# Patient Record
Sex: MALE | Race: WHITE | NOT HISPANIC OR LATINO | URBAN - METROPOLITAN AREA
[De-identification: names, ages, dates, MRNs, and addresses within clinical notes are randomized per-mention and may not be internally consistent; named-entity substitution may affect disease eponyms.]

---

## 2023-01-30 ENCOUNTER — HOSPITAL ENCOUNTER (OUTPATIENT)
Dept: CARDIOLOGY | Facility: HOSPITAL | Age: 28
Discharge: HOME | End: 2023-01-30
Attending: INTERNAL MEDICINE
Payer: COMMERCIAL

## 2023-01-30 ENCOUNTER — OFFICE VISIT (OUTPATIENT)
Dept: CARDIOLOGY | Facility: CLINIC | Age: 28
End: 2023-01-30
Payer: COMMERCIAL

## 2023-01-30 ENCOUNTER — OFFICE VISIT (OUTPATIENT)
Dept: INTERNAL MEDICINE | Facility: CLINIC | Age: 28
End: 2023-01-30
Payer: COMMERCIAL

## 2023-01-30 VITALS
SYSTOLIC BLOOD PRESSURE: 134 MMHG | HEIGHT: 67 IN | BODY MASS INDEX: 32.89 KG/M2 | TEMPERATURE: 98 F | DIASTOLIC BLOOD PRESSURE: 88 MMHG | OXYGEN SATURATION: 98 % | WEIGHT: 209.56 LBS | HEART RATE: 61 BPM | RESPIRATION RATE: 18 BRPM

## 2023-01-30 VITALS
HEART RATE: 57 BPM | HEIGHT: 61 IN | DIASTOLIC BLOOD PRESSURE: 90 MMHG | OXYGEN SATURATION: 98 % | WEIGHT: 212.5 LBS | SYSTOLIC BLOOD PRESSURE: 134 MMHG | BODY MASS INDEX: 40.12 KG/M2

## 2023-01-30 DIAGNOSIS — R07.9 CHEST PAIN ON EXERTION: Primary | ICD-10-CM

## 2023-01-30 DIAGNOSIS — R06.09 DYSPNEA ON EXERTION: ICD-10-CM

## 2023-01-30 DIAGNOSIS — Z00.00 ROUTINE GENERAL MEDICAL EXAMINATION AT A HEALTH CARE FACILITY: Primary | ICD-10-CM

## 2023-01-30 DIAGNOSIS — Z13.220 SCREENING FOR LIPOID DISORDERS: ICD-10-CM

## 2023-01-30 DIAGNOSIS — R07.9 CHEST PAIN ON EXERTION: ICD-10-CM

## 2023-01-30 DIAGNOSIS — Z13.29 THYROID DISORDER SCREEN: ICD-10-CM

## 2023-01-30 DIAGNOSIS — I10 PRIMARY HYPERTENSION: Primary | ICD-10-CM

## 2023-01-30 DIAGNOSIS — Z11.59 NEED FOR HEPATITIS C SCREENING TEST: ICD-10-CM

## 2023-01-30 DIAGNOSIS — Z11.4 SCREENING FOR HIV (HUMAN IMMUNODEFICIENCY VIRUS): ICD-10-CM

## 2023-01-30 DIAGNOSIS — E78.2 MIXED HYPERLIPIDEMIA: ICD-10-CM

## 2023-01-30 DIAGNOSIS — I10 ESSENTIAL HYPERTENSION: Primary | ICD-10-CM

## 2023-01-30 PROCEDURE — 1159F MED LIST DOCD IN RCRD: CPT | Mod: CPTII,S$GLB,, | Performed by: FAMILY MEDICINE

## 2023-01-30 PROCEDURE — 3075F PR MOST RECENT SYSTOLIC BLOOD PRESS GE 130-139MM HG: ICD-10-PCS | Mod: CPTII,S$GLB,, | Performed by: INTERNAL MEDICINE

## 2023-01-30 PROCEDURE — 3075F SYST BP GE 130 - 139MM HG: CPT | Mod: CPTII,S$GLB,, | Performed by: INTERNAL MEDICINE

## 2023-01-30 PROCEDURE — 3075F PR MOST RECENT SYSTOLIC BLOOD PRESS GE 130-139MM HG: ICD-10-PCS | Mod: CPTII,S$GLB,, | Performed by: FAMILY MEDICINE

## 2023-01-30 PROCEDURE — 3075F SYST BP GE 130 - 139MM HG: CPT | Mod: CPTII,S$GLB,, | Performed by: FAMILY MEDICINE

## 2023-01-30 PROCEDURE — 3008F PR BODY MASS INDEX (BMI) DOCUMENTED: ICD-10-PCS | Mod: CPTII,S$GLB,, | Performed by: FAMILY MEDICINE

## 2023-01-30 PROCEDURE — 93005 ELECTROCARDIOGRAM TRACING: CPT

## 2023-01-30 PROCEDURE — 1160F RVW MEDS BY RX/DR IN RCRD: CPT | Mod: CPTII,S$GLB,, | Performed by: INTERNAL MEDICINE

## 2023-01-30 PROCEDURE — 3008F BODY MASS INDEX DOCD: CPT | Mod: CPTII,S$GLB,, | Performed by: FAMILY MEDICINE

## 2023-01-30 PROCEDURE — 3079F DIAST BP 80-89 MM HG: CPT | Mod: CPTII,S$GLB,, | Performed by: FAMILY MEDICINE

## 2023-01-30 PROCEDURE — 90471 IMMUNIZATION ADMIN: CPT | Mod: S$GLB,,, | Performed by: FAMILY MEDICINE

## 2023-01-30 PROCEDURE — 99999 PR PBB SHADOW E&M-EST. PATIENT-LVL IV: CPT | Mod: PBBFAC,,, | Performed by: INTERNAL MEDICINE

## 2023-01-30 PROCEDURE — 3080F DIAST BP >= 90 MM HG: CPT | Mod: CPTII,S$GLB,, | Performed by: INTERNAL MEDICINE

## 2023-01-30 PROCEDURE — 1159F MED LIST DOCD IN RCRD: CPT | Mod: CPTII,S$GLB,, | Performed by: INTERNAL MEDICINE

## 2023-01-30 PROCEDURE — 99204 OFFICE O/P NEW MOD 45 MIN: CPT | Mod: S$GLB,,, | Performed by: INTERNAL MEDICINE

## 2023-01-30 PROCEDURE — 93010 EKG 12-LEAD: ICD-10-PCS | Mod: ,,, | Performed by: INTERNAL MEDICINE

## 2023-01-30 PROCEDURE — 93010 ELECTROCARDIOGRAM REPORT: CPT | Mod: ,,, | Performed by: INTERNAL MEDICINE

## 2023-01-30 PROCEDURE — 3080F PR MOST RECENT DIASTOLIC BLOOD PRESSURE >= 90 MM HG: ICD-10-PCS | Mod: CPTII,S$GLB,, | Performed by: INTERNAL MEDICINE

## 2023-01-30 PROCEDURE — 1160F PR REVIEW ALL MEDS BY PRESCRIBER/CLIN PHARMACIST DOCUMENTED: ICD-10-PCS | Mod: CPTII,S$GLB,, | Performed by: INTERNAL MEDICINE

## 2023-01-30 PROCEDURE — 99204 PR OFFICE/OUTPT VISIT, NEW, LEVL IV, 45-59 MIN: ICD-10-PCS | Mod: S$GLB,,, | Performed by: INTERNAL MEDICINE

## 2023-01-30 PROCEDURE — 99999 PR PBB SHADOW E&M-NEW PATIENT-LVL IV: CPT | Mod: PBBFAC,,, | Performed by: FAMILY MEDICINE

## 2023-01-30 PROCEDURE — 99204 OFFICE O/P NEW MOD 45 MIN: CPT | Mod: 25,S$GLB,, | Performed by: FAMILY MEDICINE

## 2023-01-30 PROCEDURE — 3008F BODY MASS INDEX DOCD: CPT | Mod: CPTII,S$GLB,, | Performed by: INTERNAL MEDICINE

## 2023-01-30 PROCEDURE — 99999 PR PBB SHADOW E&M-EST. PATIENT-LVL IV: ICD-10-PCS | Mod: PBBFAC,,, | Performed by: INTERNAL MEDICINE

## 2023-01-30 PROCEDURE — 1159F PR MEDICATION LIST DOCUMENTED IN MEDICAL RECORD: ICD-10-PCS | Mod: CPTII,S$GLB,, | Performed by: INTERNAL MEDICINE

## 2023-01-30 PROCEDURE — 90686 IIV4 VACC NO PRSV 0.5 ML IM: CPT | Mod: S$GLB,,, | Performed by: FAMILY MEDICINE

## 2023-01-30 PROCEDURE — 1159F PR MEDICATION LIST DOCUMENTED IN MEDICAL RECORD: ICD-10-PCS | Mod: CPTII,S$GLB,, | Performed by: FAMILY MEDICINE

## 2023-01-30 PROCEDURE — 99204 PR OFFICE/OUTPT VISIT, NEW, LEVL IV, 45-59 MIN: ICD-10-PCS | Mod: 25,S$GLB,, | Performed by: FAMILY MEDICINE

## 2023-01-30 PROCEDURE — 90686 FLU VACCINE (QUAD) GREATER THAN OR EQUAL TO 3YO PRESERVATIVE FREE IM: ICD-10-PCS | Mod: S$GLB,,, | Performed by: FAMILY MEDICINE

## 2023-01-30 PROCEDURE — 90471 FLU VACCINE (QUAD) GREATER THAN OR EQUAL TO 3YO PRESERVATIVE FREE IM: ICD-10-PCS | Mod: S$GLB,,, | Performed by: FAMILY MEDICINE

## 2023-01-30 PROCEDURE — 3008F PR BODY MASS INDEX (BMI) DOCUMENTED: ICD-10-PCS | Mod: CPTII,S$GLB,, | Performed by: INTERNAL MEDICINE

## 2023-01-30 PROCEDURE — 4010F ACE/ARB THERAPY RXD/TAKEN: CPT | Mod: CPTII,S$GLB,, | Performed by: INTERNAL MEDICINE

## 2023-01-30 PROCEDURE — 4010F PR ACE/ARB THEARPY RXD/TAKEN: ICD-10-PCS | Mod: CPTII,S$GLB,, | Performed by: INTERNAL MEDICINE

## 2023-01-30 PROCEDURE — 3079F PR MOST RECENT DIASTOLIC BLOOD PRESSURE 80-89 MM HG: ICD-10-PCS | Mod: CPTII,S$GLB,, | Performed by: FAMILY MEDICINE

## 2023-01-30 PROCEDURE — 99999 PR PBB SHADOW E&M-NEW PATIENT-LVL IV: ICD-10-PCS | Mod: PBBFAC,,, | Performed by: FAMILY MEDICINE

## 2023-01-30 RX ORDER — LOSARTAN POTASSIUM 25 MG/1
25 TABLET ORAL DAILY
Qty: 30 TABLET | Refills: 1 | Status: SHIPPED | OUTPATIENT
Start: 2023-01-30 | End: 2023-03-01 | Stop reason: SDUPTHER

## 2023-01-30 NOTE — PROGRESS NOTES
Subjective:       Patient ID: Alejandro Mancia is a 27 y.o. male here today to establish care.    Chief Complaint: Establish Care    Patient presents to clinic today to establish care.  Patient reports concerns about his blood pressure.  He reports it has been borderline for several years with occasional spikes with associated symptoms.  He reports having blood pressure is elevated at times up to 180 systolic over 90s diastolic.  He reports having spotty vision and headaches when this occurs.  Patient also reports shortness of breath and chest pain with exertion.  He works as a  and commonly climbs ladders.  Patient is otherwise without concerns today.    Review of Systems   Constitutional:  Positive for fatigue (Chronic). Negative for chills, fever and unexpected weight change.   HENT:  Negative for congestion, dental problem, ear pain, hearing loss, rhinorrhea and trouble swallowing.    Eyes:  Positive for visual disturbance (Chronic, when blood pressure is elevated). Negative for pain.   Respiratory:  Positive for shortness of breath (Chronic, when active). Negative for cough.    Cardiovascular:  Positive for chest pain (Chronic, when active). Negative for palpitations and leg swelling.   Gastrointestinal:  Negative for abdominal distention, abdominal pain, blood in stool, constipation, diarrhea, nausea and vomiting.   Genitourinary:  Negative for difficulty urinating, scrotal swelling and testicular pain.   Musculoskeletal:  Negative for arthralgias and myalgias.   Skin:  Negative for rash.   Neurological:  Positive for weakness (Chronic, when blood pressure is elevated) and headaches (Chronic, when blood pressure is elevated). Negative for dizziness and numbness.   Hematological:  Negative for adenopathy. Does not bruise/bleed easily.   Psychiatric/Behavioral:  Positive for dysphoric mood (Chronic) and sleep disturbance (Chronic). The patient is nervous/anxious (Chronic).      Objective:       Physical Exam  Vitals reviewed.   Constitutional:       General: He is not in acute distress.     Appearance: He is well-developed.   HENT:      Head: Normocephalic and atraumatic.   Eyes:      General: Lids are normal. No scleral icterus.     Extraocular Movements: Extraocular movements intact.      Conjunctiva/sclera: Conjunctivae normal.      Pupils: Pupils are equal, round, and reactive to light.   Cardiovascular:      Rate and Rhythm: Normal rate and regular rhythm.      Heart sounds: No murmur heard.    No friction rub. No gallop.   Pulmonary:      Effort: Pulmonary effort is normal.      Breath sounds: Normal breath sounds. No decreased breath sounds, wheezing, rhonchi or rales.   Neurological:      Mental Status: He is alert and oriented to person, place, and time.      Cranial Nerves: No cranial nerve deficit.      Gait: Gait normal.   Psychiatric:         Mood and Affect: Mood and affect normal.       Assessment:       1. Essential hypertension    2. Dyspnea on exertion    3. Chest pain on exertion        Plan:   1. Essential hypertension  Assessment & Plan:  Will start patient on losartan 25 mg daily and reassess in 1 month    Orders:  -     losartan (COZAAR) 25 MG tablet; Take 1 tablet (25 mg total) by mouth once daily.  Dispense: 30 tablet; Refill: 1    2. Dyspnea on exertion  -     Ambulatory referral/consult to Cardiology; Future; Expected date: 02/06/2023    3. Chest pain on exertion  -     Ambulatory referral/consult to Cardiology; Future; Expected date: 02/06/2023    Other orders  -     Influenza - Quadrivalent (PF)        Health Maintenance reviewed/updated.

## 2023-01-30 NOTE — PROGRESS NOTES
Subjective:   Patient ID:  Alejandro Mancia is a 27 y.o. male who presents for evaluation of Chest Pain and Shortness of Breath      26 yo male. Referred for high BP and HLD.  PMH HNT and HLD   C/o episodes of blurred visions for 30 min, headache for the whole day nausea weakness. Checked the BP at one episode and was 170/95 mmHG. 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 And once a month.  No smoking and drinking  No f/h premature CAD   The labs from the work done in  normal liver and kidney function, CBC.  and  possibly not fast work        Past Medical History:   Diagnosis Date    ADHD (attention deficit hyperactivity disorder)     Anxiety     Hypertension        Past Surgical History:   Procedure Laterality Date    SMALL INTESTINE SURGERY         Social History     Tobacco Use    Smoking status: Never    Smokeless tobacco: Never   Substance Use Topics    Alcohol use: Never    Drug use: Never       Family History   Problem Relation Age of Onset    Cancer Maternal Uncle     Hypertension Maternal Grandmother     Hypertension Paternal Grandfather        Review of Systems   Constitutional: Negative for decreased appetite, diaphoresis, fever, malaise/fatigue and night sweats.   HENT:  Negative for nosebleeds.    Eyes:  Positive for blurred vision. Negative for double vision.   Cardiovascular:  Positive for dyspnea on exertion. Negative for chest pain, claudication, irregular heartbeat, leg swelling, near-syncope, orthopnea, palpitations, paroxysmal nocturnal dyspnea and syncope.   Respiratory:  Negative for cough, shortness of breath, sleep disturbances due to breathing, snoring, sputum production and wheezing.    Endocrine: Negative for cold intolerance and polyuria.   Hematologic/Lymphatic: Does not bruise/bleed easily.   Skin:  Negative for rash.   Musculoskeletal:  Negative for back pain, falls, joint pain, joint swelling and neck pain.    Gastrointestinal:  Negative for abdominal pain, heartburn, nausea and vomiting.   Genitourinary:  Negative for dysuria, frequency and hematuria.   Neurological:  Positive for headaches. Negative for difficulty with concentration, dizziness, focal weakness, light-headedness, numbness, seizures and weakness.   Psychiatric/Behavioral:  Negative for depression, memory loss and substance abuse. The patient does not have insomnia.    Allergic/Immunologic: Negative for HIV exposure and hives.     Objective:   Physical Exam  HENT:      Head: Normocephalic.   Eyes:      Pupils: Pupils are equal, round, and reactive to light.   Neck:      Thyroid: No thyromegaly.      Vascular: Normal carotid pulses. No carotid bruit or JVD.   Cardiovascular:      Rate and Rhythm: Normal rate and regular rhythm. No extrasystoles are present.     Chest Wall: PMI is not displaced.      Pulses: Normal pulses.      Heart sounds: Normal heart sounds. No murmur heard.    No gallop. No S3 sounds.   Pulmonary:      Effort: No respiratory distress.      Breath sounds: Normal breath sounds. No stridor.   Abdominal:      General: Bowel sounds are normal.      Palpations: Abdomen is soft.      Tenderness: There is no abdominal tenderness. There is no rebound.   Musculoskeletal:         General: Normal range of motion.   Skin:     Findings: No rash.   Neurological:      Mental Status: He is alert and oriented to person, place, and time.   Psychiatric:         Behavior: Behavior normal.       No results found for: CHOL  No results found for: HDL  No results found for: LDLCALC  No results found for: TRIG  No results found for: CHOLHDL    Chemistry    No results found for: NA, K, CL, CO2, BUN, CREATININE, GLU No results found for: CALCIUM, ALKPHOS, AST, ALT, BILITOT, ESTGFRAFRICA, EGFRNONAA       No results found for: LABA1C, HGBA1C  No results found for: TSH  No results found for: INR, PROTIME  No results found for: WBC, HGB, HCT, MCV,  PLT  BNP  @LABRCNTIP(BNP,BNPTRIAGEBLO)@  CrCl cannot be calculated (No successful lab value found.).  No results found in the last 24 hours.  No results found in the last 24 hours.  No results found in the last 24 hours.    Assessment:      1. Primary hypertension    2. Dyspnea on exertion    3. Chest pain on exertion    4. Mixed hyperlipidemia        Plan:   Echo and ETT for HTN and chest pain/SOB  Harpreet./renin and metanephrine to r/o 2nd HTN  Renal ARtery US ordered  Fasting Lipid profile for HLD  Continue Losartan for HTN    Counseled DASH  Recommend heart-healthy diet, weight control and regular exercise.  Carroll. Risk modification.   I have reviewed all pertinent labs and cardiac studies independently. Plans and recommendations have been formulated under my direct supervision. All questions answered and patient voiced understanding.   If symptoms persist go to the ED  F/u with PCP

## 2023-01-31 ENCOUNTER — TELEPHONE (OUTPATIENT)
Dept: CARDIOLOGY | Facility: CLINIC | Age: 28
End: 2023-01-31
Payer: COMMERCIAL

## 2023-01-31 NOTE — TELEPHONE ENCOUNTER
Pt contacted about results, pt verbalized understanding.          ----- Message from David Tavarez MD sent at 1/30/2023  9:36 PM CST -----  . Nomral range

## 2023-02-08 ENCOUNTER — TELEPHONE (OUTPATIENT)
Dept: CARDIOLOGY | Facility: CLINIC | Age: 28
End: 2023-02-08
Payer: COMMERCIAL

## 2023-02-08 NOTE — TELEPHONE ENCOUNTER
Pt contacted about results, lvm for pt to back            ----- Message from David Tavarez MD sent at 2/7/2023 10:05 PM CST -----  The lab showed no 2nd HTN  Continue current Rx

## 2023-02-15 ENCOUNTER — TELEPHONE (OUTPATIENT)
Dept: CARDIOLOGY | Facility: HOSPITAL | Age: 28
End: 2023-02-15
Payer: COMMERCIAL

## 2023-04-14 DIAGNOSIS — I10 ESSENTIAL HYPERTENSION: ICD-10-CM

## 2023-04-14 RX ORDER — LOSARTAN POTASSIUM 25 MG/1
25 TABLET ORAL DAILY
Qty: 30 TABLET | Refills: 0 | Status: SHIPPED | OUTPATIENT
Start: 2023-04-14 | End: 2023-05-31 | Stop reason: SDUPTHER

## 2023-04-14 NOTE — TELEPHONE ENCOUNTER
Refill Routing Note   Medication(s) are not appropriate for processing by [facility] for the following reason(s):      Required labs outdated  Required vitals abnormal    [facility] action(s):  Defer Labs due            Appointments  past 12m or future 3m with PCP    Date Provider   Last Visit   1/30/2023 Antonietta Melendez MD   Next Visit   Visit date not found Antonietta Melendez MD   ED visits in past 90 days: 0        Note composed:10:52 AM 04/14/2023

## 2023-04-14 NOTE — TELEPHONE ENCOUNTER
Care Due:                  Date            Visit Type   Department     Provider  --------------------------------------------------------------------------------                                NEW PATIENT                              - OPEN       [facility] INTER[facility]  Last Visit: 01-      SCHEDULING   MEDICINE       Antonietta Melendez  Next Visit: None Scheduled  None         None Found                                                            Last  Test          Frequency    Reason                     Performed    Due Date  --------------------------------------------------------------------------------    CMP.........  12 months..  losartan.................  Not Found    Overdue    [employer] Embedded Care Gaps. Reference number: [id number]. 4/14/2023   7:28:01 AM CDT

## 2023-05-31 DIAGNOSIS — I10 ESSENTIAL HYPERTENSION: ICD-10-CM

## 2023-05-31 RX ORDER — LOSARTAN POTASSIUM 25 MG/1
25 TABLET ORAL DAILY
Qty: 90 TABLET | Refills: 0 | Status: SHIPPED | OUTPATIENT
Start: 2023-05-31 | End: 2023-10-25 | Stop reason: SDUPTHER

## 2023-05-31 NOTE — TELEPHONE ENCOUNTER
Refill Routing Note   Medication(s) are not appropriate for processing by [facility] for the following reason(s):      Required labs outdated  Required vitals abnormal    [facility] action(s):  Defer Care Due:  None identified          Appointments  past 12m or future 3m with PCP    Date Provider   Last Visit   1/30/2023 Antonietta Melendez MD   Next Visit   Visit date not found Antonietta Melendez MD   ED visits in past 90 days: 0        Note composed:12:42 PM 05/31/2023

## 2023-05-31 NOTE — TELEPHONE ENCOUNTER
No care due was identified.  [facility] Embedded Care Due Messages. Reference number: [id number].   5/31/2023 10:52:24 AM CDT

## 2023-10-25 ENCOUNTER — OFFICE VISIT (OUTPATIENT)
Dept: INTERNAL MEDICINE | Facility: CLINIC | Age: 28
End: 2023-10-25
Payer: COMMERCIAL

## 2023-10-25 VITALS
WEIGHT: 208.75 LBS | HEART RATE: 69 BPM | BODY MASS INDEX: 39.9 KG/M2 | TEMPERATURE: 97 F | DIASTOLIC BLOOD PRESSURE: 86 MMHG | SYSTOLIC BLOOD PRESSURE: 130 MMHG | OXYGEN SATURATION: 98 %

## 2023-10-25 DIAGNOSIS — F32.A ANXIETY AND DEPRESSION: Primary | ICD-10-CM

## 2023-10-25 DIAGNOSIS — F41.9 ANXIETY AND DEPRESSION: Primary | ICD-10-CM

## 2023-10-25 DIAGNOSIS — I10 ESSENTIAL HYPERTENSION: ICD-10-CM

## 2023-10-25 PROCEDURE — 1159F PR MEDICATION LIST DOCUMENTED IN MEDICAL RECORD: ICD-10-PCS | Mod: CPTII,S$GLB,, | Performed by: PHYSICIAN ASSISTANT

## 2023-10-25 PROCEDURE — 3079F DIAST BP 80-89 MM HG: CPT | Mod: CPTII,S$GLB,, | Performed by: PHYSICIAN ASSISTANT

## 2023-10-25 PROCEDURE — 99213 OFFICE O/P EST LOW 20 MIN: CPT | Mod: S$GLB,,, | Performed by: PHYSICIAN ASSISTANT

## 2023-10-25 PROCEDURE — 3008F PR BODY MASS INDEX (BMI) DOCUMENTED: ICD-10-PCS | Mod: CPTII,S$GLB,, | Performed by: PHYSICIAN ASSISTANT

## 2023-10-25 PROCEDURE — 1159F MED LIST DOCD IN RCRD: CPT | Mod: CPTII,S$GLB,, | Performed by: PHYSICIAN ASSISTANT

## 2023-10-25 PROCEDURE — 1160F PR REVIEW ALL MEDS BY PRESCRIBER/CLIN PHARMACIST DOCUMENTED: ICD-10-PCS | Mod: CPTII,S$GLB,, | Performed by: PHYSICIAN ASSISTANT

## 2023-10-25 PROCEDURE — 3075F SYST BP GE 130 - 139MM HG: CPT | Mod: CPTII,S$GLB,, | Performed by: PHYSICIAN ASSISTANT

## 2023-10-25 PROCEDURE — 4010F ACE/ARB THERAPY RXD/TAKEN: CPT | Mod: CPTII,S$GLB,, | Performed by: PHYSICIAN ASSISTANT

## 2023-10-25 PROCEDURE — 3075F PR MOST RECENT SYSTOLIC BLOOD PRESS GE 130-139MM HG: ICD-10-PCS | Mod: CPTII,S$GLB,, | Performed by: PHYSICIAN ASSISTANT

## 2023-10-25 PROCEDURE — 99999 PR PBB SHADOW E&M-EST. PATIENT-LVL IV: CPT | Mod: PBBFAC,,, | Performed by: PHYSICIAN ASSISTANT

## 2023-10-25 PROCEDURE — 1160F RVW MEDS BY RX/DR IN RCRD: CPT | Mod: CPTII,S$GLB,, | Performed by: PHYSICIAN ASSISTANT

## 2023-10-25 PROCEDURE — 3079F PR MOST RECENT DIASTOLIC BLOOD PRESSURE 80-89 MM HG: ICD-10-PCS | Mod: CPTII,S$GLB,, | Performed by: PHYSICIAN ASSISTANT

## 2023-10-25 PROCEDURE — 99213 PR OFFICE/OUTPT VISIT, EST, LEVL III, 20-29 MIN: ICD-10-PCS | Mod: S$GLB,,, | Performed by: PHYSICIAN ASSISTANT

## 2023-10-25 PROCEDURE — 99999 PR PBB SHADOW E&M-EST. PATIENT-LVL IV: ICD-10-PCS | Mod: PBBFAC,,, | Performed by: PHYSICIAN ASSISTANT

## 2023-10-25 PROCEDURE — 4010F PR ACE/ARB THEARPY RXD/TAKEN: ICD-10-PCS | Mod: CPTII,S$GLB,, | Performed by: PHYSICIAN ASSISTANT

## 2023-10-25 PROCEDURE — 3008F BODY MASS INDEX DOCD: CPT | Mod: CPTII,S$GLB,, | Performed by: PHYSICIAN ASSISTANT

## 2023-10-25 RX ORDER — LOSARTAN POTASSIUM 25 MG/1
25 TABLET ORAL DAILY
Qty: 90 TABLET | Refills: 3 | Status: SHIPPED | OUTPATIENT
Start: 2023-10-25 | End: 2024-10-24

## 2023-10-25 RX ORDER — BUPROPION HYDROCHLORIDE 150 MG/1
150 TABLET ORAL DAILY
Qty: 30 TABLET | Refills: 11 | Status: SHIPPED | OUTPATIENT
Start: 2023-10-25 | End: 2024-01-29 | Stop reason: SDUPTHER

## 2024-01-29 ENCOUNTER — OFFICE VISIT (OUTPATIENT)
Dept: INTERNAL MEDICINE | Facility: CLINIC | Age: 29
End: 2024-01-29
Payer: COMMERCIAL

## 2024-01-29 VITALS
HEART RATE: 87 BPM | TEMPERATURE: 97 F | DIASTOLIC BLOOD PRESSURE: 76 MMHG | WEIGHT: 208.44 LBS | OXYGEN SATURATION: 98 % | BODY MASS INDEX: 39.84 KG/M2 | SYSTOLIC BLOOD PRESSURE: 120 MMHG

## 2024-01-29 DIAGNOSIS — I10 ESSENTIAL HYPERTENSION: ICD-10-CM

## 2024-01-29 DIAGNOSIS — F41.9 ANXIETY AND DEPRESSION: Primary | ICD-10-CM

## 2024-01-29 DIAGNOSIS — F32.A ANXIETY AND DEPRESSION: Primary | ICD-10-CM

## 2024-01-29 PROCEDURE — 99999 PR PBB SHADOW E&M-EST. PATIENT-LVL III: CPT | Mod: PBBFAC,,, | Performed by: PHYSICIAN ASSISTANT

## 2024-01-29 PROCEDURE — 4010F ACE/ARB THERAPY RXD/TAKEN: CPT | Mod: CPTII,S$GLB,, | Performed by: PHYSICIAN ASSISTANT

## 2024-01-29 PROCEDURE — 3078F DIAST BP <80 MM HG: CPT | Mod: CPTII,S$GLB,, | Performed by: PHYSICIAN ASSISTANT

## 2024-01-29 PROCEDURE — 99214 OFFICE O/P EST MOD 30 MIN: CPT | Mod: S$GLB,,, | Performed by: PHYSICIAN ASSISTANT

## 2024-01-29 PROCEDURE — 3008F BODY MASS INDEX DOCD: CPT | Mod: CPTII,S$GLB,, | Performed by: PHYSICIAN ASSISTANT

## 2024-01-29 PROCEDURE — 3074F SYST BP LT 130 MM HG: CPT | Mod: CPTII,S$GLB,, | Performed by: PHYSICIAN ASSISTANT

## 2024-01-29 PROCEDURE — 1160F RVW MEDS BY RX/DR IN RCRD: CPT | Mod: CPTII,S$GLB,, | Performed by: PHYSICIAN ASSISTANT

## 2024-01-29 PROCEDURE — 1159F MED LIST DOCD IN RCRD: CPT | Mod: CPTII,S$GLB,, | Performed by: PHYSICIAN ASSISTANT

## 2024-01-29 RX ORDER — BUPROPION HYDROCHLORIDE 300 MG/1
300 TABLET ORAL DAILY
Qty: 30 TABLET | Refills: 11 | Status: SHIPPED | OUTPATIENT
Start: 2024-01-29 | End: 2024-06-06

## 2024-01-29 NOTE — PROGRESS NOTES
Subjective:       Patient ID: Alejandro Mancia is a 28 y.o. male.    Chief Complaint: Medication Refill (Patient would like to increase Wellbutrin and possibly Losartan.)      Patient presents to clinic today for followup of chronic conditions.    She    Review of Systems   Constitutional:  Negative for chills, fatigue, fever and unexpected weight change.   Eyes:  Negative for visual disturbance.   Respiratory:  Negative for shortness of breath.    Cardiovascular:  Negative for chest pain.   Musculoskeletal:  Negative for myalgias.   Neurological:  Negative for headaches.       Objective:      Physical Exam  Vitals and nursing note reviewed.   Constitutional:       General: He is not in acute distress.     Appearance: He is well-developed.   HENT:      Head: Normocephalic and atraumatic.   Eyes:      General: Lids are normal. No scleral icterus.     Extraocular Movements: Extraocular movements intact.      Conjunctiva/sclera: Conjunctivae normal.   Pulmonary:      Effort: Pulmonary effort is normal.   Neurological:      Mental Status: He is alert.      Cranial Nerves: No cranial nerve deficit.   Psychiatric:         Mood and Affect: Mood and affect normal.         Assessment:       1. Anxiety and depression    2. Essential hypertension        Plan:   1. Anxiety and depression  Assessment & Plan:  Patient reports some improvement of symptoms with Wellbutrin 150 mg but wishes to increase the dosage.  Will follow-up in 1 month to reassess    Orders:  -     buPROPion (WELLBUTRIN XL) 300 MG 24 hr tablet; Take 1 tablet (300 mg total) by mouth once daily.  Dispense: 30 tablet; Refill: 11    2. Essential hypertension  Assessment & Plan:  /76, controlled, continue losartan

## 2024-01-30 NOTE — ASSESSMENT & PLAN NOTE
Patient reports some improvement of symptoms with Wellbutrin 150 mg but wishes to increase the dosage.  Will follow-up in 1 month to reassess

## 2024-06-06 ENCOUNTER — LAB VISIT (OUTPATIENT)
Dept: LAB | Facility: HOSPITAL | Age: 29
End: 2024-06-06
Attending: FAMILY MEDICINE
Payer: COMMERCIAL

## 2024-06-06 ENCOUNTER — OFFICE VISIT (OUTPATIENT)
Dept: INTERNAL MEDICINE | Facility: CLINIC | Age: 29
End: 2024-06-06
Payer: COMMERCIAL

## 2024-06-06 VITALS
SYSTOLIC BLOOD PRESSURE: 124 MMHG | RESPIRATION RATE: 20 BRPM | HEART RATE: 76 BPM | DIASTOLIC BLOOD PRESSURE: 80 MMHG | WEIGHT: 200.63 LBS | HEIGHT: 61 IN | OXYGEN SATURATION: 97 % | TEMPERATURE: 99 F | BODY MASS INDEX: 37.88 KG/M2

## 2024-06-06 DIAGNOSIS — I10 ESSENTIAL HYPERTENSION: Primary | ICD-10-CM

## 2024-06-06 DIAGNOSIS — Z11.59 NEED FOR HEPATITIS C SCREENING TEST: ICD-10-CM

## 2024-06-06 DIAGNOSIS — F41.9 ANXIETY AND DEPRESSION: ICD-10-CM

## 2024-06-06 DIAGNOSIS — I10 ESSENTIAL HYPERTENSION: ICD-10-CM

## 2024-06-06 DIAGNOSIS — F32.A ANXIETY AND DEPRESSION: ICD-10-CM

## 2024-06-06 DIAGNOSIS — E66.01 SEVERE OBESITY (BMI 35.0-39.9) WITH COMORBIDITY: ICD-10-CM

## 2024-06-06 LAB
ALBUMIN SERPL BCP-MCNC: 3.9 G/DL (ref 3.5–5.2)
ALP SERPL-CCNC: 84 U/L (ref 55–135)
ALT SERPL W/O P-5'-P-CCNC: 14 U/L (ref 10–44)
ANION GAP SERPL CALC-SCNC: 7 MMOL/L (ref 8–16)
AST SERPL-CCNC: 14 U/L (ref 10–40)
BILIRUB SERPL-MCNC: 0.4 MG/DL (ref 0.1–1)
BUN SERPL-MCNC: 17 MG/DL (ref 6–20)
CALCIUM SERPL-MCNC: 9.4 MG/DL (ref 8.7–10.5)
CHLORIDE SERPL-SCNC: 107 MMOL/L (ref 95–110)
CO2 SERPL-SCNC: 28 MMOL/L (ref 23–29)
CREAT SERPL-MCNC: 0.9 MG/DL (ref 0.5–1.4)
EST. GFR  (NO RACE VARIABLE): >60 ML/MIN/1.73 M^2
GLUCOSE SERPL-MCNC: 83 MG/DL (ref 70–110)
HCV AB SERPL QL IA: NORMAL
POTASSIUM SERPL-SCNC: 3.9 MMOL/L (ref 3.5–5.1)
PROT SERPL-MCNC: 6.9 G/DL (ref 6–8.4)
SODIUM SERPL-SCNC: 142 MMOL/L (ref 136–145)

## 2024-06-06 PROCEDURE — 3079F DIAST BP 80-89 MM HG: CPT | Mod: CPTII,S$GLB,, | Performed by: FAMILY MEDICINE

## 2024-06-06 PROCEDURE — 99999 PR PBB SHADOW E&M-EST. PATIENT-LVL V: CPT | Mod: PBBFAC,,, | Performed by: FAMILY MEDICINE

## 2024-06-06 PROCEDURE — 3074F SYST BP LT 130 MM HG: CPT | Mod: CPTII,S$GLB,, | Performed by: FAMILY MEDICINE

## 2024-06-06 PROCEDURE — 3008F BODY MASS INDEX DOCD: CPT | Mod: CPTII,S$GLB,, | Performed by: FAMILY MEDICINE

## 2024-06-06 PROCEDURE — G2211 COMPLEX E/M VISIT ADD ON: HCPCS | Mod: S$GLB,,, | Performed by: FAMILY MEDICINE

## 2024-06-06 PROCEDURE — 99214 OFFICE O/P EST MOD 30 MIN: CPT | Mod: S$GLB,,, | Performed by: FAMILY MEDICINE

## 2024-06-06 PROCEDURE — 86803 HEPATITIS C AB TEST: CPT | Performed by: FAMILY MEDICINE

## 2024-06-06 PROCEDURE — 80053 COMPREHEN METABOLIC PANEL: CPT | Performed by: FAMILY MEDICINE

## 2024-06-06 PROCEDURE — 4010F ACE/ARB THERAPY RXD/TAKEN: CPT | Mod: CPTII,S$GLB,, | Performed by: FAMILY MEDICINE

## 2024-06-06 PROCEDURE — 36415 COLL VENOUS BLD VENIPUNCTURE: CPT | Performed by: FAMILY MEDICINE

## 2024-06-06 PROCEDURE — 1160F RVW MEDS BY RX/DR IN RCRD: CPT | Mod: CPTII,S$GLB,, | Performed by: FAMILY MEDICINE

## 2024-06-06 PROCEDURE — 1159F MED LIST DOCD IN RCRD: CPT | Mod: CPTII,S$GLB,, | Performed by: FAMILY MEDICINE

## 2024-06-06 RX ORDER — BUPROPION HYDROCHLORIDE 150 MG/1
150 TABLET ORAL DAILY
Qty: 30 TABLET | Refills: 2 | Status: SHIPPED | OUTPATIENT
Start: 2024-06-06 | End: 2025-06-06

## 2024-06-06 NOTE — PROGRESS NOTES
Subjective:       Patient ID: Alejandro Mancia is a 28 y.o. male.    Chief Complaint: ADHD    History of Present Illness    Patient presents today for a discussion about discontinuing Wellbutrin and possibly starting Vyvanse again. The patient expressed concerns about his current medication, Wellbutrin, believing it is causing an increase in his anxiety levels. He is contemplating discontinuing Wellbutrin. He was previously diagnosed with ADHD and is considering re-initiating Vyvanse treatment, which he had been on for a significant period in the past. The patient is currently on a regimen of Losartan 25 mg for the management of his high blood pressure. The patient began therapy approximately two months ago. He reports feeling stagnant and lacks motivation to engage in activities. He undergoes annual labs at the [facility] as part of his work requirements. He also mentions the need for lab work related to his blood pressure management. The patient received one dose of the HPV vaccine in 2014. It is unclear whether the patient completed the vaccine series. He expressed disinterest in receiving any further COVID-19 booster shots. Patient is otherwise without concerns today.      Review of Systems   Constitutional:  Negative for chills, fatigue, fever and unexpected weight change.   Eyes:  Negative for visual disturbance.   Respiratory:  Negative for shortness of breath.    Cardiovascular:  Negative for chest pain.   Musculoskeletal:  Negative for myalgias.   Neurological:  Negative for headaches.   Psychiatric/Behavioral:  The patient is nervous/anxious.          Objective:      Physical Exam  Vitals reviewed.   Constitutional:       General: He is not in acute distress.     Appearance: He is well-developed.   HENT:      Head: Normocephalic and atraumatic.   Eyes:      General: Lids are normal. No scleral icterus.     Extraocular Movements: Extraocular movements intact.      Conjunctiva/sclera: Conjunctivae  normal.      Pupils: Pupils are equal, round, and reactive to light.   Pulmonary:      Effort: Pulmonary effort is normal.   Neurological:      Mental Status: He is alert and oriented to person, place, and time.      Cranial Nerves: No cranial nerve deficit.      Gait: Gait normal.   Psychiatric:         Mood and Affect: Mood and affect normal.         Assessment:       1. Essential hypertension    2. Anxiety and depression    3. Severe obesity (BMI 35.0-39.9) with comorbidity    4. Need for hepatitis C screening test        Plan:   1. Essential hypertension  Assessment & Plan:  Controlled, continue losartan    Orders:  -     Comprehensive Metabolic Panel; Future; Expected date: 06/06/2024    2. Anxiety and depression  Assessment & Plan:   Adjusted the patient's Wellbutrin dosage to 150mg daily to manage increased anxiety symptoms.   Explained the process of weaning off Wellbutrin and advised the patient to monitor their response to the reduced dosage.   Recommend formal ADHD testing before considering stimulant medications due to the patient's high blood pressure.   Referred the patient to [facility] for ADHD testing and evaluation.   Educated the patient about the risks of stimulant medications and the importance of formal ADHD testing.    Orders:  -     Ambulatory referral/consult to Psychology; Future; Expected date: 06/13/2024  -     buPROPion (WELLBUTRIN XL) 150 MG TB24 tablet; Take 1 tablet (150 mg total) by mouth once daily.  Dispense: 30 tablet; Refill: 2    3. Severe obesity (BMI 35.0-39.9) with comorbidity  Assessment & Plan:  Body mass index is 38.35 kg/m².  improving  Wt Readings from Last 10 Encounters:   06/06/24 91 kg (200 lb 9.9 oz)   01/29/24 94.6 kg (208 lb 7.1 oz)   10/25/23 94.7 kg (208 lb 12.4 oz)   01/30/23 96.4 kg (212 lb 8.4 oz)   01/30/23 95.1 kg (209 lb 8.8 oz)           4. Need for hepatitis C screening test  -     Hepatitis C Antibody; Future; Expected date: 06/06/2024      Patient  expressed understanding and agreement with plan.    Visit today included increased complexity associated with the care of the episodic problem anxiety, which was addressed while instituting co-management of the longitudinal care of the patient due to the serious and/or complex managed problem(s) .    I have evaluated and discussed management associated with medical care services that serve as the continuing focal point for all needed health care services and/or with medical care services that are part of ongoing care related to my patient's single, serious condition or a complex condition(s).    I am providing ongoing care and I am the primary care provider for this patient, and they are being managed, monitored, and/or observed for their chronic conditions over time.     I have addressed their ongoing health maintenance requirements and needs for all health care services and reviewed co-management plans provided by specialty providers when available.    Health Maintenance Due   Topic Date Due    Hepatitis C Screening  Never done    HPV Vaccines (2 - Male 3-dose series) 06/11/2014     Health Maintenance reviewed/updated.    Follow up in about 6 months (around 12/6/2024), or if symptoms worsen or fail to improve, for EP/f/u 6 month with key Chen today.    This note was generated with the assistance of ambient listening technology. Verbal consent was obtained by the patient and accompanying visitor(s) for the recording of patient appointment to facilitate this note. I attest to having reviewed and edited the generated note for accuracy, though some syntax or spelling errors may persist. Please contact the author of this note for any clarification.

## 2024-06-06 NOTE — ASSESSMENT & PLAN NOTE
Adjusted the patient's Wellbutrin dosage to 150mg daily to manage increased anxiety symptoms.   Explained the process of weaning off Wellbutrin and advised the patient to monitor their response to the reduced dosage.   Recommend formal ADHD testing before considering stimulant medications due to the patient's high blood pressure.   Referred the patient to [facility] for ADHD testing and evaluation.   Educated the patient about the risks of stimulant medications and the importance of formal ADHD testing.

## 2024-07-09 ENCOUNTER — OFFICE VISIT (OUTPATIENT)
Dept: INTERNAL MEDICINE | Facility: CLINIC | Age: 29
End: 2024-07-09
Payer: COMMERCIAL

## 2024-07-09 VITALS
HEART RATE: 70 BPM | OXYGEN SATURATION: 97 % | WEIGHT: 198.88 LBS | HEIGHT: 60 IN | DIASTOLIC BLOOD PRESSURE: 80 MMHG | SYSTOLIC BLOOD PRESSURE: 138 MMHG | BODY MASS INDEX: 39.05 KG/M2

## 2024-07-09 DIAGNOSIS — F41.9 ANXIETY: Primary | ICD-10-CM

## 2024-07-09 PROCEDURE — 3079F DIAST BP 80-89 MM HG: CPT | Mod: CPTII,S$GLB,, | Performed by: FAMILY MEDICINE

## 2024-07-09 PROCEDURE — 4010F ACE/ARB THERAPY RXD/TAKEN: CPT | Mod: CPTII,S$GLB,, | Performed by: FAMILY MEDICINE

## 2024-07-09 PROCEDURE — 3075F SYST BP GE 130 - 139MM HG: CPT | Mod: CPTII,S$GLB,, | Performed by: FAMILY MEDICINE

## 2024-07-09 PROCEDURE — 99214 OFFICE O/P EST MOD 30 MIN: CPT | Mod: S$GLB,,, | Performed by: FAMILY MEDICINE

## 2024-07-09 PROCEDURE — 1159F MED LIST DOCD IN RCRD: CPT | Mod: CPTII,S$GLB,, | Performed by: FAMILY MEDICINE

## 2024-07-09 PROCEDURE — 1160F RVW MEDS BY RX/DR IN RCRD: CPT | Mod: CPTII,S$GLB,, | Performed by: FAMILY MEDICINE

## 2024-07-09 PROCEDURE — G2211 COMPLEX E/M VISIT ADD ON: HCPCS | Mod: S$GLB,,, | Performed by: FAMILY MEDICINE

## 2024-07-09 PROCEDURE — 3008F BODY MASS INDEX DOCD: CPT | Mod: CPTII,S$GLB,, | Performed by: FAMILY MEDICINE

## 2024-07-09 PROCEDURE — 99999 PR PBB SHADOW E&M-EST. PATIENT-LVL III: CPT | Mod: PBBFAC,,, | Performed by: FAMILY MEDICINE

## 2024-07-09 RX ORDER — BUSPIRONE HYDROCHLORIDE 5 MG/1
5 TABLET ORAL 2 TIMES DAILY
Qty: 60 TABLET | Refills: 5 | Status: SHIPPED | OUTPATIENT
Start: 2024-07-09 | End: 2025-07-09

## 2024-07-09 NOTE — PATIENT INSTRUCTIONS
When you feel yourself getting anxious/stressed take a breathing time out. Breathe in to count of 4, hold for count of 2, breathe out for count of 8; repeat 10 times and it should help you feel more calm.    Try 3-5 minutes daily of guided meditation  [employer] - has free 10 day introduction  Simply Being  Calm  Simple Habit

## 2024-07-11 DIAGNOSIS — Z76.89 ENCOUNTER TO ESTABLISH CARE: Primary | ICD-10-CM

## 2024-07-11 DIAGNOSIS — I10 PRIMARY HYPERTENSION: ICD-10-CM

## 2024-07-11 DIAGNOSIS — Z00.00 ROUTINE ADULT HEALTH MAINTENANCE: ICD-10-CM

## 2024-07-17 ENCOUNTER — TELEPHONE (OUTPATIENT)
Dept: CARDIOLOGY | Facility: CLINIC | Age: 29
End: 2024-07-17
Payer: COMMERCIAL

## 2024-07-17 NOTE — TELEPHONE ENCOUNTER
Called and LVM for pt to return call to the office regarding appointment missed for today due to openings this afternoon in case pt is still wanting to be seen today.

## 2024-09-16 ENCOUNTER — OFFICE VISIT (OUTPATIENT)
Dept: CARDIOLOGY | Facility: CLINIC | Age: 29
End: 2024-09-16
Payer: COMMERCIAL

## 2024-09-16 VITALS
SYSTOLIC BLOOD PRESSURE: 140 MMHG | HEART RATE: 92 BPM | WEIGHT: 213.5 LBS | HEIGHT: 67 IN | BODY MASS INDEX: 33.51 KG/M2 | OXYGEN SATURATION: 97 % | DIASTOLIC BLOOD PRESSURE: 80 MMHG

## 2024-09-16 DIAGNOSIS — E66.9 OBESITY (BMI 30.0-34.9): ICD-10-CM

## 2024-09-16 DIAGNOSIS — I10 ESSENTIAL HYPERTENSION: ICD-10-CM

## 2024-09-16 DIAGNOSIS — E78.2 MIXED HYPERLIPIDEMIA: Primary | ICD-10-CM

## 2024-09-16 DIAGNOSIS — F32.A ANXIETY AND DEPRESSION: ICD-10-CM

## 2024-09-16 DIAGNOSIS — F41.9 ANXIETY AND DEPRESSION: ICD-10-CM

## 2024-09-16 PROCEDURE — 3079F DIAST BP 80-89 MM HG: CPT | Mod: CPTII,S$GLB,, | Performed by: STUDENT IN AN ORGANIZED HEALTH CARE EDUCATION/TRAINING PROGRAM

## 2024-09-16 PROCEDURE — 3008F BODY MASS INDEX DOCD: CPT | Mod: CPTII,S$GLB,, | Performed by: STUDENT IN AN ORGANIZED HEALTH CARE EDUCATION/TRAINING PROGRAM

## 2024-09-16 PROCEDURE — 3077F SYST BP >= 140 MM HG: CPT | Mod: CPTII,S$GLB,, | Performed by: STUDENT IN AN ORGANIZED HEALTH CARE EDUCATION/TRAINING PROGRAM

## 2024-09-16 PROCEDURE — 99214 OFFICE O/P EST MOD 30 MIN: CPT | Mod: S$GLB,,, | Performed by: STUDENT IN AN ORGANIZED HEALTH CARE EDUCATION/TRAINING PROGRAM

## 2024-09-16 PROCEDURE — 4010F ACE/ARB THERAPY RXD/TAKEN: CPT | Mod: CPTII,S$GLB,, | Performed by: STUDENT IN AN ORGANIZED HEALTH CARE EDUCATION/TRAINING PROGRAM

## 2024-09-16 PROCEDURE — 99999 PR PBB SHADOW E&M-EST. PATIENT-LVL III: CPT | Mod: PBBFAC,,, | Performed by: STUDENT IN AN ORGANIZED HEALTH CARE EDUCATION/TRAINING PROGRAM

## 2024-09-16 PROCEDURE — 1159F MED LIST DOCD IN RCRD: CPT | Mod: CPTII,S$GLB,, | Performed by: STUDENT IN AN ORGANIZED HEALTH CARE EDUCATION/TRAINING PROGRAM

## 2024-09-16 RX ORDER — LOSARTAN POTASSIUM 50 MG/1
50 TABLET ORAL DAILY
Qty: 90 TABLET | Refills: 3 | Status: SHIPPED | OUTPATIENT
Start: 2024-09-16 | End: 2025-09-16

## 2024-09-16 RX ORDER — TADALAFIL 5 MG/1
5 TABLET ORAL
COMMUNITY
Start: 2024-08-07

## 2024-09-16 NOTE — PROGRESS NOTES
Section of Cardiology                  Cardiac Clinic Note    Chief Complaint/Reason for consultation: high BP       HPI:   Alejandro Mancia is a 29 y.o. male with h/o hypertension, anxiety/depression who was referred to cardiology clinic by PCP Dr. Melendez for evaluation.    9/16/24  Secondary blood pressure workup negative  BP has been high- checks BP at home  Has been having chest pain and palpitations  Symptoms occurs at work or laying down   Gets headaches and vision issues  Does not exercise   Has not been stressed at home  Coffee- 2 cups a day  No energy drinks   Weight up 5 lbs - but has on boots today   Reports erectile dysfunction     Smokes mariajuana  No tobacco use  Etoh about once a month    Family hx: htn     No dizziness, syncope       EKG NSR, no acute ST - T wave changes    ECHO  No results found for this or any previous visit.       STRESS TEST No results found for this or any previous visit.       LHC No results found for this or any previous visit.            ROS: All 10 systems reviewed. Please refer to the HPI for pertinent positives. All other systems negative.     Past Medical History  Past Medical History:   Diagnosis Date    ADHD (attention deficit hyperactivity disorder)     Anxiety     Hypertension        Surgical History  Past Surgical History:   Procedure Laterality Date    SMALL INTESTINE SURGERY            Allergies:   Review of patient's allergies indicates:  No Known Allergies    Social History:  Social History     Socioeconomic History    Marital status:    Tobacco Use    Smoking status: Never    Smokeless tobacco: Never   Substance and Sexual Activity    Alcohol use: Never    Drug use: Never    Sexual activity: Never   Social History Narrative    ** Merged History Encounter **          Social Determinants of Health     Financial Resource Strain: High Risk (7/9/2024)    Overall Financial Resource Strain (CARDIA)     Difficulty of Paying Living Expenses: Hard  "  Food Insecurity: No Food Insecurity (7/9/2024)    Hunger Vital Sign     Worried About Running Out of Food in the Last Year: Never true     Ran Out of Food in the Last Year: Never true   Physical Activity: Inactive (7/9/2024)    Exercise Vital Sign     Days of Exercise per Week: 0 days     Minutes of Exercise per Session: 0 min   Stress: Stress Concern Present (7/9/2024)    [employer] - Occupational Stress Questionnaire     Feeling of Stress : Very much   Housing Stability: High Risk (7/9/2024)    Housing Stability Vital Sign     Unable to Pay for Housing in the Last Year: Yes       Family History:  family history includes Cancer in his maternal uncle; Hypertension in his maternal grandmother and paternal grandfather.    Home Medications:  Current Outpatient Medications on File Prior to Visit   Medication Sig Dispense Refill    busPIRone (BUSPAR) 5 MG Tab Take 1 tablet (5 mg total) by mouth 2 (two) times daily. 60 tablet 5    losartan (COZAAR) 25 MG tablet Take 1 tablet (25 mg total) by mouth once daily. 90 tablet 3    tadalafiL (CIALIS) 5 MG tablet Take 5 mg by mouth as needed.       No current facility-administered medications on file prior to visit.       Physical exam:  BP (!) 140/90 (BP Location: Left arm, Patient Position: Sitting, BP Method: Large (Manual))   Pulse 92   Ht 5' 7" (1.702 m)   Wt 96.9 kg (213 lb 8.3 oz)   SpO2 97%   BMI 33.44 kg/m²         General: Pt is a 29 y.o. year old male who is AAOx3, in NAD, is pleasant, well nourished, looks stated age  HEENT: PERRL, EOMI, Oral mucosa pink & moist  CVS  No abnormal cardiac pulsations noted on inspection. JVP not raised. The apical impulse is normal on palpation, and is located in the left 5th intercostal space in the mid - clavicular line. No palpable thrills or abnormal pulsations noted. RR, S1 - S2 heard, no murmurs, rubs or gallops appreciated.   PUL : CTA B/L. No wheezes/crackles heard   ABD : BS +, soft. No " "tenderness elicited   LE : No C/C/E. Distal Pulses palpable B/L         LABS:    Chemistry:   Lab Results   Component Value Date     06/06/2024    K 3.9 06/06/2024     06/06/2024    CO2 28 06/06/2024    BUN 17 06/06/2024    CREATININE 0.9 06/06/2024    CALCIUM 9.4 06/06/2024     Cardiac Markers: No results found for: "CKTOTAL", "CKMB", "CKMBINDEX", "TROPONINI"  Cardiac Markers (Last 3): No results found for: "CKTOTAL", "CKMB", "CKMBINDEX", "TROPONINI"  CBC:   Lab Results   Component Value Date    WBC 8.0 06/14/2022    HGB 14.4 06/14/2022    HCT 45.8 06/14/2022     06/14/2022     Lipids:   Lab Results   Component Value Date    CHOL 177 01/30/2023    TRIG 156 (H) 01/30/2023    HDL 36 (L) 01/30/2023     Coagulation: No results found for: "PT", "INR", "APTT"        Assessment    1. Mixed hyperlipidemia    2. Essential hypertension    3. Obesity (BMI 30.0-34.9)    4. Anxiety and depression         Plan:      HTN  Elevated  Increase losartan 50 mg daily    Chest pain/palpitations  Most likely 2/2 high BP  If BP stable next visit but with same symptoms, will consider ETT and echo     Anxiety/depression  Stable  Currently on buspar    Obesity, Body mass index is 33.44 kg/m².   Low salt, low fat diet  Exercise as tolerated, at least 30 min daily     This note was prepared using voice recognition system and is likely to have sound alike errors that may have been overlooked even after proofreading.     I have reviewed all pertinent chart information.  Plans and recommendations have been formulated under my direct supervision. All questions answered and patient voiced understanding.   If symptoms persist go to the ED.    RTC in 1 m f/u with Negrita Khan MD  Cardiology          "

## 2024-11-18 ENCOUNTER — OFFICE VISIT (OUTPATIENT)
Dept: INTERNAL MEDICINE | Facility: CLINIC | Age: 29
End: 2024-11-18
Payer: COMMERCIAL

## 2024-11-18 VITALS
WEIGHT: 202.69 LBS | BODY MASS INDEX: 31.81 KG/M2 | HEART RATE: 99 BPM | HEIGHT: 67 IN | OXYGEN SATURATION: 95 % | SYSTOLIC BLOOD PRESSURE: 126 MMHG | DIASTOLIC BLOOD PRESSURE: 80 MMHG

## 2024-11-18 DIAGNOSIS — F32.A ANXIETY AND DEPRESSION: ICD-10-CM

## 2024-11-18 DIAGNOSIS — F41.9 ANXIETY AND DEPRESSION: ICD-10-CM

## 2024-11-18 DIAGNOSIS — F41.9 ANXIETY: ICD-10-CM

## 2024-11-18 DIAGNOSIS — I10 ESSENTIAL HYPERTENSION: ICD-10-CM

## 2024-11-18 DIAGNOSIS — Z23 NEED FOR VACCINATION: Primary | ICD-10-CM

## 2024-11-18 PROCEDURE — 1159F MED LIST DOCD IN RCRD: CPT | Mod: CPTII,S$GLB,, | Performed by: PHYSICIAN ASSISTANT

## 2024-11-18 PROCEDURE — 99213 OFFICE O/P EST LOW 20 MIN: CPT | Mod: 25,S$GLB,, | Performed by: PHYSICIAN ASSISTANT

## 2024-11-18 PROCEDURE — 1160F RVW MEDS BY RX/DR IN RCRD: CPT | Mod: CPTII,S$GLB,, | Performed by: PHYSICIAN ASSISTANT

## 2024-11-18 PROCEDURE — 3008F BODY MASS INDEX DOCD: CPT | Mod: CPTII,S$GLB,, | Performed by: PHYSICIAN ASSISTANT

## 2024-11-18 PROCEDURE — 90656 IIV3 VACC NO PRSV 0.5 ML IM: CPT | Mod: S$GLB,,, | Performed by: PHYSICIAN ASSISTANT

## 2024-11-18 PROCEDURE — 99999 PR PBB SHADOW E&M-EST. PATIENT-LVL III: CPT | Mod: PBBFAC,,, | Performed by: PHYSICIAN ASSISTANT

## 2024-11-18 PROCEDURE — 90471 IMMUNIZATION ADMIN: CPT | Mod: S$GLB,,, | Performed by: PHYSICIAN ASSISTANT

## 2024-11-18 PROCEDURE — 3074F SYST BP LT 130 MM HG: CPT | Mod: CPTII,S$GLB,, | Performed by: PHYSICIAN ASSISTANT

## 2024-11-18 PROCEDURE — 3079F DIAST BP 80-89 MM HG: CPT | Mod: CPTII,S$GLB,, | Performed by: PHYSICIAN ASSISTANT

## 2024-11-18 PROCEDURE — 4010F ACE/ARB THERAPY RXD/TAKEN: CPT | Mod: CPTII,S$GLB,, | Performed by: PHYSICIAN ASSISTANT

## 2024-11-18 RX ORDER — BUSPIRONE HYDROCHLORIDE 5 MG/1
10 TABLET ORAL 2 TIMES DAILY
Qty: 60 TABLET | Refills: 5 | Status: SHIPPED | OUTPATIENT
Start: 2024-11-18 | End: 2025-02-16

## 2024-11-18 RX ORDER — HYDROXYZINE HYDROCHLORIDE 25 MG/1
25 TABLET, FILM COATED ORAL 3 TIMES DAILY PRN
Qty: 90 TABLET | Refills: 0 | Status: SHIPPED | OUTPATIENT
Start: 2024-11-18 | End: 2024-12-18

## 2024-11-18 NOTE — PROGRESS NOTES
Subjective:       Patient ID: Alejandro Mancia is a 29 y.o. male.    CHIEF COMPLAINT:  - Alejandro presents with worsening anxiety and recent panic attacks.    HPI:  - Alejandro reports worsening anxiety over the past 2-3 months, with 2-3 panic attacks in the past month involving rapid heartbeat, difficulty breathing, and excessive sweating. The baseline anxiety level is higher than previously. Alejandro has hypertension and recently saw a cardiologist who increased blood pressure medication, but symptoms persist. Currently taking BuSpar for anxiety, which is ineffective due to increased baseline anxiety. Alejandro is seeing a licensed clinical  for therapy and engages in afternoon walks after work for some relief. Alejandro reports both anxiety and depression. Past use of Lexapro and Prozac was discontinued due to side effects, particularly sexual dysfunction.    MEDICATIONS:  - BuSpar (buspirone), for anxiety, not effective at current dose  - Blood pressure medication, for hypertension, recently increased by cardiologist    MEDICAL HISTORY:  - Hypertension  - Anxiety         Review of Systems   Constitutional:  Negative for chills, fatigue, fever and unexpected weight change.   Eyes:  Negative for visual disturbance.   Respiratory:  Negative for shortness of breath.    Cardiovascular:  Negative for chest pain.   Musculoskeletal:  Negative for myalgias.   Neurological:  Negative for headaches.   Psychiatric/Behavioral:  Positive for dysphoric mood and sleep disturbance. The patient is nervous/anxious.        Objective:      Physical Exam  Vitals reviewed.   Constitutional:       General: He is not in acute distress.     Appearance: Normal appearance. He is well-developed and normal weight. He is not ill-appearing or toxic-appearing.   HENT:      Head: Normocephalic and atraumatic.   Eyes:      General: Lids are normal. No scleral icterus.     Extraocular Movements: Extraocular movements intact.      Conjunctiva/sclera:  Conjunctivae normal.      Pupils: Pupils are equal, round, and reactive to light.   Cardiovascular:      Rate and Rhythm: Normal rate and regular rhythm.      Heart sounds: Normal heart sounds. No murmur heard.     No friction rub. No gallop.   Pulmonary:      Effort: Pulmonary effort is normal.      Breath sounds: Normal breath sounds. No decreased breath sounds, wheezing, rhonchi or rales.   Neurological:      General: No focal deficit present.      Mental Status: He is alert and oriented to person, place, and time. Mental status is at baseline.      Cranial Nerves: No cranial nerve deficit.      Gait: Gait normal.   Psychiatric:         Mood and Affect: Mood and affect normal.         Behavior: Behavior normal.         Thought Content: Thought content normal.         Judgment: Judgment normal.      Comments: anxious         Assessment:       1. Need for vaccination    2. Anxiety and depression    3. Essential hypertension    4. Anxiety        Plan:   1. Need for vaccination  -     Influenza - Trivalent - PF (ADULT)    2. Anxiety and depression    3. Essential hypertension    4. Anxiety  -     busPIRone (BUSPAR) 5 MG Tab; Take 2 tablets (10 mg total) by mouth 2 (two) times daily.  Dispense: 60 tablet; Refill: 5  -     hydrOXYzine HCL (ATARAX) 25 MG tablet; Take 1 tablet (25 mg total) by mouth 3 (three) times daily as needed for Anxiety.  Dispense: 90 tablet; Refill: 0        Assessment & Plan    Assessed patient's worsening anxiety and recent panic attacks  Determined current BuSpar dosage insufficient; decided to increase dosage significantly  Considered adding hydroxyzine for acute anxiety management  Evaluated need for psychiatric referral; opted to reassess after medication adjustment  Noted patient's history with SSRIs (Lexapro, Prozac) and associated side effects  Prioritized maximizing BuSpar before considering alternative medications due to fewer side effects    GENERALIZED ANXIETY DISORDER AND PANIC  DISORDER:   Explained BuSpar typically takes about a month to show significant effects.   Discussed that medication adjustments may worsen symptoms in the first 2 weeks before improvement.   Explained hydroxyzine can cause sleepiness and takes about 30 minutes to be effective.   Clarified difference between therapy with LCSW and psychiatric care.   Alejandro to continue with current therapy sessions.   Recommend practicing meditation and yoga for anxiety management.   Alejandro to maintain current routine of afternoon walks.   Increased BuSpar to 10 mg twice daily.   Started hydroxyzine, 1 tablet up to 3 times daily as needed for anxiety attacks.   Take at bedtime if experiencing nighttime anxiety.   Do not drive until familiar with hydroxyzine's effects.    ESSENTIAL HYPERTENSION:   Continued current blood pressure medication.    FOLLOW-UP:   Follow up in 4 weeks for medication review.   Return sooner if severe anxiety persists after 1 week of new regimen.   Contact the office if needing adjustments before scheduled follow-up.               This note was generated with the assistance of ambient listening technology. I attest to having reviewed and edited the generated note for accuracy, though some syntax or spelling errors may persist. Please contact the author of this note for any clarification.

## 2024-12-05 ENCOUNTER — PATIENT MESSAGE (OUTPATIENT)
Dept: INTERNAL MEDICINE | Facility: CLINIC | Age: 29
End: 2024-12-05
Payer: COMMERCIAL

## 2024-12-18 ENCOUNTER — OFFICE VISIT (OUTPATIENT)
Dept: INTERNAL MEDICINE | Facility: CLINIC | Age: 29
End: 2024-12-18
Payer: COMMERCIAL

## 2024-12-18 ENCOUNTER — LAB VISIT (OUTPATIENT)
Dept: LAB | Facility: HOSPITAL | Age: 29
End: 2024-12-18
Attending: PHYSICIAN ASSISTANT
Payer: COMMERCIAL

## 2024-12-18 VITALS
HEART RATE: 85 BPM | DIASTOLIC BLOOD PRESSURE: 80 MMHG | HEIGHT: 67 IN | WEIGHT: 210.31 LBS | BODY MASS INDEX: 33.01 KG/M2 | OXYGEN SATURATION: 96 % | SYSTOLIC BLOOD PRESSURE: 122 MMHG

## 2024-12-18 DIAGNOSIS — F32.A ANXIETY AND DEPRESSION: ICD-10-CM

## 2024-12-18 DIAGNOSIS — F41.9 ANXIETY AND DEPRESSION: Primary | ICD-10-CM

## 2024-12-18 DIAGNOSIS — N52.9 ERECTILE DYSFUNCTION, UNSPECIFIED ERECTILE DYSFUNCTION TYPE: ICD-10-CM

## 2024-12-18 DIAGNOSIS — F32.A ANXIETY AND DEPRESSION: Primary | ICD-10-CM

## 2024-12-18 DIAGNOSIS — F41.9 ANXIETY AND DEPRESSION: ICD-10-CM

## 2024-12-18 PROCEDURE — 3074F SYST BP LT 130 MM HG: CPT | Mod: CPTII,S$GLB,, | Performed by: PHYSICIAN ASSISTANT

## 2024-12-18 PROCEDURE — 99999 PR PBB SHADOW E&M-EST. PATIENT-LVL IV: CPT | Mod: PBBFAC,,, | Performed by: PHYSICIAN ASSISTANT

## 2024-12-18 PROCEDURE — 1159F MED LIST DOCD IN RCRD: CPT | Mod: CPTII,S$GLB,, | Performed by: PHYSICIAN ASSISTANT

## 2024-12-18 PROCEDURE — 3079F DIAST BP 80-89 MM HG: CPT | Mod: CPTII,S$GLB,, | Performed by: PHYSICIAN ASSISTANT

## 2024-12-18 PROCEDURE — 1160F RVW MEDS BY RX/DR IN RCRD: CPT | Mod: CPTII,S$GLB,, | Performed by: PHYSICIAN ASSISTANT

## 2024-12-18 PROCEDURE — 4010F ACE/ARB THERAPY RXD/TAKEN: CPT | Mod: CPTII,S$GLB,, | Performed by: PHYSICIAN ASSISTANT

## 2024-12-18 PROCEDURE — 36415 COLL VENOUS BLD VENIPUNCTURE: CPT | Performed by: PHYSICIAN ASSISTANT

## 2024-12-18 PROCEDURE — 99214 OFFICE O/P EST MOD 30 MIN: CPT | Mod: S$GLB,,, | Performed by: PHYSICIAN ASSISTANT

## 2024-12-18 PROCEDURE — 3008F BODY MASS INDEX DOCD: CPT | Mod: CPTII,S$GLB,, | Performed by: PHYSICIAN ASSISTANT

## 2024-12-18 PROCEDURE — 82040 ASSAY OF SERUM ALBUMIN: CPT | Performed by: PHYSICIAN ASSISTANT

## 2024-12-18 RX ORDER — BUSPIRONE HYDROCHLORIDE 10 MG/1
20 TABLET ORAL 2 TIMES DAILY
Qty: 120 TABLET | Refills: 11 | Status: SHIPPED | OUTPATIENT
Start: 2024-12-18 | End: 2025-12-18

## 2024-12-18 RX ORDER — ALPRAZOLAM 0.25 MG/1
0.25 TABLET ORAL 3 TIMES DAILY PRN
Qty: 45 TABLET | Refills: 1 | Status: SHIPPED | OUTPATIENT
Start: 2024-12-18 | End: 2025-01-17

## 2024-12-18 RX ORDER — TADALAFIL 10 MG/1
10 TABLET ORAL DAILY PRN
Qty: 30 TABLET | Refills: 2 | Status: SHIPPED | OUTPATIENT
Start: 2024-12-18 | End: 2025-06-16

## 2024-12-18 NOTE — PROGRESS NOTES
Subjective:       Patient ID: Alejandro Mancia is a 29 y.o. male.      CHIEF COMPLAINT:  - Alejandro presents for follow-up of anxiety symptoms and to discuss medication adjustments, as well as concerns about erectile dysfunction.    HPI:  - Alejandro reports ongoing anxiety symptoms, including panic attacks. The current medication, hydroxyzine, and Buspar 5 mg twice daily (total 20 mg/day) have been ineffective in managing these symptoms. He attends therapy sessions to help manage his anxiety.  - Alejandro also reports issues with erectile dysfunction, decreased libido, and difficulty achieving and maintaining erections. He is currently taking Cialis 5 mg once or twice weekly but finds it ineffective. He expresses concern about potential psychological dependence on medication for erectile function.  - Alejandro inquires about the possibility of low testosterone levels contributing to his erectile issues, referencing a peer with a similar diagnosis. He also considers the potential impact of his anxiety or depression on his erectile dysfunction.  - Alejandro denies any issues with blood pressure or diabetes.    MEDICATIONS:  - Buspar 5 mg, 2 tablets twice daily, for anxiety, minimal efficacy  - Hydroxyzine, for anxiety, minimal efficacy  - Lexapro, previously taken  - Cialis 5 mg, taken once or twice weekly as needed for erectile dysfunction, minimal efficacy    MEDICAL HISTORY:  - Anxiety  - Depression  - Erectile dysfunction    TEST RESULTS:  - Testosterone panel: Results pending         Review of Systems   Constitutional:  Negative for chills, fatigue, fever and unexpected weight change.   HENT:  Negative for congestion, dental problem, ear pain, hearing loss, rhinorrhea and trouble swallowing.    Eyes:  Negative for pain and visual disturbance.   Respiratory:  Negative for cough and shortness of breath.    Cardiovascular:  Negative for chest pain, palpitations and leg swelling.   Gastrointestinal:  Negative for abdominal distention,  abdominal pain, blood in stool, constipation, diarrhea, nausea and vomiting.   Genitourinary:  Negative for difficulty urinating.   Musculoskeletal:  Negative for arthralgias and myalgias.   Skin:  Negative for rash.   Neurological:  Negative for dizziness, weakness, numbness and headaches.   Hematological:  Negative for adenopathy. Does not bruise/bleed easily.   Psychiatric/Behavioral:  Negative for agitation, dysphoric mood and sleep disturbance. The patient is nervous/anxious.        Objective:      Physical Exam  Vitals reviewed.   Constitutional:       General: He is not in acute distress.     Appearance: Normal appearance. He is well-developed and normal weight. He is not ill-appearing or toxic-appearing.   HENT:      Head: Normocephalic and atraumatic.   Eyes:      General: Lids are normal. No scleral icterus.     Extraocular Movements: Extraocular movements intact.      Conjunctiva/sclera: Conjunctivae normal.      Pupils: Pupils are equal, round, and reactive to light.   Cardiovascular:      Rate and Rhythm: Normal rate and regular rhythm.      Heart sounds: Normal heart sounds. No murmur heard.     No friction rub. No gallop.   Pulmonary:      Effort: Pulmonary effort is normal.      Breath sounds: Normal breath sounds. No decreased breath sounds, wheezing, rhonchi or rales.   Neurological:      General: No focal deficit present.      Mental Status: He is alert and oriented to person, place, and time. Mental status is at baseline.      Cranial Nerves: No cranial nerve deficit.      Gait: Gait normal.   Psychiatric:         Mood and Affect: Mood and affect normal.         Behavior: Behavior normal.         Thought Content: Thought content normal.         Judgment: Judgment normal.         Assessment:       1. Anxiety and depression    2. Erectile dysfunction, unspecified erectile dysfunction type        Plan:   1. Anxiety and depression  -     busPIRone (BUSPAR) 10 MG tablet; Take 2 tablets (20 mg total)  by mouth 2 (two) times daily.  Dispense: 120 tablet; Refill: 11  -     ALPRAZolam (XANAX) 0.25 MG tablet; Take 1 tablet (0.25 mg total) by mouth 3 (three) times daily as needed for Anxiety.  Dispense: 45 tablet; Refill: 1  -     TESTOSTERONE PANEL; Future; Expected date: 12/18/2024    2. Erectile dysfunction, unspecified erectile dysfunction type  -     tadalafiL (CIALIS) 10 MG tablet; Take 1 tablet (10 mg total) by mouth daily as needed for Erectile Dysfunction.  Dispense: 30 tablet; Refill: 2        Assessment & Plan    IMPRESSION:  - Increased Buspar dosage from 20mg to 40mg total daily due to ongoing anxiety symptoms  - Added short-term Xanax for anxiety management until Buspar reaches therapeutic levels  - Discontinued hydroxyzine due to lack of efficacy  - Increased Cialis dosage from 5mg to 10mg for erectile dysfunction  - Ordered testosterone panel to investigate potential causes of erectile dysfunction and low libido  - May refer to endocrinology or urology if testosterone levels are abnormal or if erectile dysfunction persists after medication adjustments. Patient understands we do not treat low T in our clinic.     GENERALIZED ANXIETY DISORDER AND PANIC DISORDER:   Started Xanax 0.25mg, take 1 tablet up to 3 times daily as needed for anxiety; may increase to 0.5mg (2 tablets) if needed, but should contact office if requiring higher dose.   Increased Buspar from 5mg to 10mg, take 2 tablets twice daily (40mg total daily).   Discontinued hydroxyzine.   Alejandro to continue attending therapy sessions.   Contact office through patient portal if anxiety symptoms worsen or if needing to increase Xanax dosage.    ERECTILE DYSFUNCTION:   Increased Cialis from 5mg to 10mg, take 30-45 minutes before sexual activity, up to once or twice weekly.   Explained potential side effects of Cialis, including prolonged erection (>4 hours) requiring emergency care.   Discussed importance of disclosing Cialis use to emergency  personnel if experiencing chest pain within 3 days of taking medication.    LABS:   Testosterone panel ordered.   Message through [employer] once lab results are available.    FOLLOW-UP:   Follow up in 4 weeks.               This note was generated with the assistance of ambient listening technology. Verbal consent was obtained by the patient and accompanying visitor(s) for the recording of patient appointment to facilitate this note. I attest to having reviewed and edited the generated note for accuracy, though some syntax or spelling errors may persist. Please contact the author of this note for any clarification.

## 2024-12-23 ENCOUNTER — PATIENT MESSAGE (OUTPATIENT)
Dept: INTERNAL MEDICINE | Facility: CLINIC | Age: 29
End: 2024-12-23
Payer: COMMERCIAL

## 2024-12-26 LAB
ALBUMIN SERPL-MCNC: 4.7 G/DL (ref 3.6–5.1)
SHBG SERPL-SCNC: 22 NMOL/L (ref 10–50)
TESTOST FREE SERPL-MCNC: 58.4 PG/ML (ref 46–224)
TESTOST SERPL-MCNC: 339 NG/DL (ref 250–1100)
TESTOSTERONE.FREE+WB SERPL-MCNC: 125.2 NG/DL

## 2025-01-10 ENCOUNTER — TELEPHONE (OUTPATIENT)
Dept: INTERNAL MEDICINE | Facility: CLINIC | Age: 30
End: 2025-01-10
Payer: COMMERCIAL

## 2025-01-10 DIAGNOSIS — F32.A ANXIETY AND DEPRESSION: ICD-10-CM

## 2025-01-10 DIAGNOSIS — F41.9 ANXIETY AND DEPRESSION: ICD-10-CM

## 2025-01-10 DIAGNOSIS — N52.9 ERECTILE DYSFUNCTION, UNSPECIFIED ERECTILE DYSFUNCTION TYPE: ICD-10-CM

## 2025-01-10 NOTE — TELEPHONE ENCOUNTER
Refill Routing Note   Medication(s) are not appropriate for processing by [facility] for the following reason(s):        No active prescription written by provider  New or recently adjusted medication    [facility] action(s):  Defer             Appointments  past 12m or future 3m with PCP    Date Provider   Last Visit   12/18/2024 Elisabeth iWley PA-C   Next Visit   1/21/2025 Elisabeth Wiley PA-C   ED visits in past 90 days: 0        Note composed:5:30 PM 01/10/2025

## 2025-01-10 NOTE — TELEPHONE ENCOUNTER
Refill Routing Note   Medication(s) are not appropriate for processing by [facility] for the following reason(s):        Outside of protocol  No active prescription written by provider  New or recently adjusted medication    [facility] action(s):  Route             Appointments  past 12m or future 3m with PCP    Date Provider   Last Visit   12/18/2024 Elisabeht Wiley PA-C   Next Visit   1/10/2025 Elisabeth Wiley PA-C   ED visits in past 90 days: 0        Note composed:5:09 PM 01/10/2025

## 2025-01-20 RX ORDER — ALPRAZOLAM 0.25 MG/1
0.25 TABLET ORAL 3 TIMES DAILY PRN
Qty: 45 TABLET | Refills: 1 | OUTPATIENT
Start: 2025-01-20 | End: 2025-02-19

## 2025-01-21 NOTE — TELEPHONE ENCOUNTER
Provider Staff:  Please note Refusal of medication.     Action required for this patient.      Requested Prescriptions     Refused Prescriptions Disp Refills    ALPRAZolam (XANAX) 0.25 MG tablet 45 tablet 1     Sig: Take 1 tablet (0.25 mg total) by mouth 3 (three) times daily as needed for Anxiety.     Refused By: CARL CÁRDENAS     Reason for Refusal: Patient needs an appointment      Thanks!  [facility]   Note composed: 01/21/2025 10:38 AM

## 2025-01-24 ENCOUNTER — PATIENT MESSAGE (OUTPATIENT)
Dept: INTERNAL MEDICINE | Facility: CLINIC | Age: 30
End: 2025-01-24
Payer: COMMERCIAL

## 2025-01-30 RX ORDER — TADALAFIL 10 MG/1
10 TABLET ORAL DAILY PRN
Qty: 30 TABLET | Refills: 2 | OUTPATIENT
Start: 2025-01-30 | End: 2025-07-29

## 2025-02-13 ENCOUNTER — OFFICE VISIT (OUTPATIENT)
Dept: INTERNAL MEDICINE | Facility: CLINIC | Age: 30
End: 2025-02-13
Payer: COMMERCIAL

## 2025-02-13 VITALS
OXYGEN SATURATION: 97 % | HEART RATE: 94 BPM | BODY MASS INDEX: 32.91 KG/M2 | WEIGHT: 209.69 LBS | DIASTOLIC BLOOD PRESSURE: 76 MMHG | HEIGHT: 67 IN | SYSTOLIC BLOOD PRESSURE: 124 MMHG

## 2025-02-13 DIAGNOSIS — H61.23 BILATERAL IMPACTED CERUMEN: ICD-10-CM

## 2025-02-13 DIAGNOSIS — F41.9 ANXIETY AND DEPRESSION: ICD-10-CM

## 2025-02-13 DIAGNOSIS — F32.A ANXIETY AND DEPRESSION: ICD-10-CM

## 2025-02-13 DIAGNOSIS — I10 ESSENTIAL HYPERTENSION: ICD-10-CM

## 2025-02-13 DIAGNOSIS — J06.9 UPPER RESPIRATORY TRACT INFECTION, UNSPECIFIED TYPE: Primary | ICD-10-CM

## 2025-02-13 NOTE — PATIENT INSTRUCTIONS
I recommend over the counter Robitussin-DM or Mucinex-DM for congestion and cough, and Allegra (generic is fine) for sinus pressure.

## 2025-02-13 NOTE — PROGRESS NOTES
Subjective:       Patient ID: Alejandro Mancia is a 29 y.o. male.    CHIEF COMPLAINT:  - Alejandro presents with sore throat, sinus pressure, and cough that started yesterday.    HPI:  - Alejandro reports a sore throat, sinus pressure, and cough that began yesterday. The sore throat is described as more itchy than painful, rated 2 out of 10 in severity. He reports difficulty swallowing, ear pain, and headache. He had trouble breathing through his nose last night, which affected his sleep, but the cough has not been disruptive.  - He took OTC loratadine this morning, which helped relieve some nasal pressure. He feels better today compared to last night. He attended a birthday party last weekend, which he considers a possible source of exposure to illness.  - He has a history of earwax buildup and sometimes has difficulty hearing out of both ears. He has tried using saline and Aeronasal to drain his ears when they are hot, but states it does not always help. Using cotton swabs exacerbates the condition.  - He denies fever, chills, body aches, dizziness, flu, and COVID-19.    MEDICATIONS:  - Loratadine (Claritin), taken daily for allergies  - Blood pressure medication    MEDICAL HISTORY:  - Hypertension  - Flu shot received         Review of Systems   Constitutional:  Negative for chills, fatigue, fever and unexpected weight change.   HENT:  Positive for sinus pressure, sinus pain and sore throat. Negative for congestion, dental problem, ear pain, hearing loss, rhinorrhea and trouble swallowing.    Eyes:  Negative for pain and visual disturbance.   Respiratory:  Positive for cough. Negative for shortness of breath.    Cardiovascular:  Negative for chest pain, palpitations and leg swelling.   Gastrointestinal:  Negative for abdominal distention, abdominal pain, blood in stool, constipation, diarrhea, nausea and vomiting.   Genitourinary:  Negative for difficulty urinating.   Musculoskeletal:  Negative for arthralgias and  myalgias.   Skin:  Negative for rash.   Neurological:  Positive for headaches. Negative for dizziness, weakness and numbness.   Hematological:  Negative for adenopathy. Does not bruise/bleed easily.   Psychiatric/Behavioral:  Negative for agitation, dysphoric mood and sleep disturbance.        Objective:      Physical Exam  Vitals reviewed.   Constitutional:       General: He is not in acute distress.     Appearance: Normal appearance. He is well-developed. He is obese. He is not ill-appearing or toxic-appearing.   HENT:      Head: Normocephalic and atraumatic.      Right Ear: Ear canal and external ear normal. There is impacted cerumen.      Left Ear: Ear canal and external ear normal. There is impacted cerumen.      Nose: Mucosal edema and rhinorrhea present.      Comments: Beefy red appearance to nasal mucosa     Mouth/Throat:      Pharynx: Oropharynx is clear. Uvula midline.   Eyes:      General: Lids are normal. No scleral icterus.     Extraocular Movements: Extraocular movements intact.      Conjunctiva/sclera: Conjunctivae normal.      Pupils: Pupils are equal, round, and reactive to light.   Cardiovascular:      Rate and Rhythm: Normal rate and regular rhythm.      Heart sounds: Normal heart sounds. No murmur heard.     No friction rub. No gallop.   Pulmonary:      Effort: Pulmonary effort is normal. No respiratory distress.      Breath sounds: Normal breath sounds. No decreased breath sounds, wheezing, rhonchi or rales.   Musculoskeletal:      Cervical back: Normal range of motion and neck supple.   Lymphadenopathy:      Cervical: No cervical adenopathy.   Skin:     General: Skin is warm and dry.      Findings: No rash.   Neurological:      General: No focal deficit present.      Mental Status: He is alert and oriented to person, place, and time. Mental status is at baseline.      Cranial Nerves: No cranial nerve deficit.      Gait: Gait normal.   Psychiatric:         Mood and Affect: Mood and affect  normal.         Behavior: Behavior normal.         Thought Content: Thought content normal.         Judgment: Judgment normal.         Assessment:       1. Upper respiratory tract infection, unspecified type    2. Essential hypertension    3. Anxiety and depression    4. Bilateral impacted cerumen        Plan:   1. Upper respiratory tract infection, unspecified type    2. Essential hypertension    3. Anxiety and depression    4. Bilateral impacted cerumen  -     Ambulatory referral/consult to ENT; Future; Expected date: 02/20/2025        Assessment & Plan    IMPRESSION:  - Assessed symptoms as likely viral infection, not flu or COVID-19  - Ruled out strep throat based on symptoms and exam  - Noted significant earwax blockage in both ears  - Determined no immediate need for antibiotics given viral etiology    VIRAL INFECTION:   Explained the typical course of a viral infection, noting that symptoms may worsen before improving.   Discussed the expected timeline for symptom improvement (5-7 days).   Reviewed the differences between viral infections, influenza, and streptococcal pharyngitis.   Instructed the patient to monitor for fever over 38°C (100.4°F) and watch for worsening symptoms.   Examined the patient's throat and determined it does not appear to be streptococcal in nature.   Assessed the condition as likely viral, not streptococcal or influenza.   Recommend OTC medications such as DayQuil, NyQuil, or Mucinex DM for symptom relief.   Advised the patient to return if symptoms do not improve after 7 days.   Recommend acetaminophen or ibuprofen as needed for discomfort.   Advised on OTC medications for symptom relief.   Recommend Mucinex DM or Robitussin DM for congestion relief.      SORE THROAT:   Noted that the patient reports a sore throat that began yesterday.   Evaluated the patient's sore throat pain as 2 out of 10, described as more itchy than painful.    SINUS PRESSURE AND NASAL CONGESTION:   Noted that  the patient reports sinus pressure and difficulty breathing through the nose.   Evaluated that the patient's previous use of allergy medication helped relieve some nasal pressure.   Recommended fexofenadine (Allegra) as a stronger alternative to loratadine for allergy symptoms.    COUGH:   Noted that the patient reports a mild cough.    HEADACHE:   Noted that the patient reports having a headache.    CERUMEN IMPACTION:   Noted that the patient sometimes has difficulty hearing.   Examined the patient's ears and found both to be completely occluded with cerumen.   Referred the patient to an otolaryngologist for cerumen removal.    HYPERTENSION:   Evaluated the patient's blood pressure and noted it to be within normal limits.   Acknowledged that the patient is currently on antihypertensive medication.   Advised the patient to avoid decongestants (D) in cold medicines due to potential effects on blood pressure.    INFLUENZA VACCINATION:   Noted that the patient has received an influenza vaccination.    FOLLOW UP:   Follow up on next Monday as scheduled.   Contact the office if symptoms persist beyond 7 days or have not improved by next appointment.               This note was generated with the assistance of ambient listening technology. Verbal consent was obtained by the patient and accompanying visitor(s) for the recording of patient appointment to facilitate this note. I attest to having reviewed and edited the generated note for accuracy, though some syntax or spelling errors may persist. Please contact the author of this note for any clarification.      Visit today included increased complexity associated with the care of the episodic problem hypertension, which was addressed while instituting co-management of the longitudinal care of the patient due to the serious and/or complex managed problem(s) .    I have evaluated and discussed management associated with medical care services that serve as the continuing  focal point for all needed health care services and/or with medical care services that are part of ongoing care related to my patient's single, serious condition or a complex condition(s).    I am providing ongoing care and I am the primary care provider for this patient, and they are being managed, monitored, and/or observed for their chronic conditions over time.     I have addressed their ongoing health maintenance requirements and needs for all health care services and reviewed co-management plans provided by specialty providers when available.    Health Maintenance Due   Topic Date Due    COVID-19 Vaccine (4 - 2024-25 season) 09/01/2024

## 2025-02-17 ENCOUNTER — OFFICE VISIT (OUTPATIENT)
Dept: INTERNAL MEDICINE | Facility: CLINIC | Age: 30
End: 2025-02-17
Payer: COMMERCIAL

## 2025-02-17 VITALS
WEIGHT: 207.44 LBS | SYSTOLIC BLOOD PRESSURE: 125 MMHG | HEIGHT: 67 IN | HEART RATE: 104 BPM | BODY MASS INDEX: 32.56 KG/M2 | OXYGEN SATURATION: 98 % | DIASTOLIC BLOOD PRESSURE: 78 MMHG

## 2025-02-17 DIAGNOSIS — E66.01 SEVERE OBESITY (BMI 35.0-39.9) WITH COMORBIDITY: ICD-10-CM

## 2025-02-17 DIAGNOSIS — F41.9 ANXIETY AND DEPRESSION: Primary | ICD-10-CM

## 2025-02-17 DIAGNOSIS — Z79.899 ENCOUNTER FOR LONG-TERM (CURRENT) USE OF MEDICATIONS: ICD-10-CM

## 2025-02-17 DIAGNOSIS — N52.9 ERECTILE DYSFUNCTION, UNSPECIFIED ERECTILE DYSFUNCTION TYPE: ICD-10-CM

## 2025-02-17 DIAGNOSIS — I10 ESSENTIAL HYPERTENSION: ICD-10-CM

## 2025-02-17 DIAGNOSIS — F32.A ANXIETY AND DEPRESSION: Primary | ICD-10-CM

## 2025-02-17 DIAGNOSIS — E78.2 MIXED HYPERLIPIDEMIA: ICD-10-CM

## 2025-02-17 PROCEDURE — G2211 COMPLEX E/M VISIT ADD ON: HCPCS | Mod: S$GLB,,, | Performed by: PHYSICIAN ASSISTANT

## 2025-02-17 PROCEDURE — 1159F MED LIST DOCD IN RCRD: CPT | Mod: CPTII,S$GLB,, | Performed by: PHYSICIAN ASSISTANT

## 2025-02-17 PROCEDURE — 4010F ACE/ARB THERAPY RXD/TAKEN: CPT | Mod: CPTII,S$GLB,, | Performed by: PHYSICIAN ASSISTANT

## 2025-02-17 PROCEDURE — 99999 PR PBB SHADOW E&M-EST. PATIENT-LVL III: CPT | Mod: PBBFAC,,, | Performed by: PHYSICIAN ASSISTANT

## 2025-02-17 PROCEDURE — 3078F DIAST BP <80 MM HG: CPT | Mod: CPTII,S$GLB,, | Performed by: PHYSICIAN ASSISTANT

## 2025-02-17 PROCEDURE — 99214 OFFICE O/P EST MOD 30 MIN: CPT | Mod: S$GLB,,, | Performed by: PHYSICIAN ASSISTANT

## 2025-02-17 PROCEDURE — 3074F SYST BP LT 130 MM HG: CPT | Mod: CPTII,S$GLB,, | Performed by: PHYSICIAN ASSISTANT

## 2025-02-17 PROCEDURE — 3008F BODY MASS INDEX DOCD: CPT | Mod: CPTII,S$GLB,, | Performed by: PHYSICIAN ASSISTANT

## 2025-02-17 RX ORDER — TADALAFIL 10 MG/1
10 TABLET ORAL DAILY PRN
Qty: 90 TABLET | Refills: 1 | Status: SHIPPED | OUTPATIENT
Start: 2025-02-17 | End: 2025-08-16

## 2025-02-17 RX ORDER — ALPRAZOLAM 0.25 MG/1
0.25 TABLET ORAL 3 TIMES DAILY PRN
Qty: 45 TABLET | Refills: 0 | Status: SHIPPED | OUTPATIENT
Start: 2025-02-17 | End: 2025-03-19

## 2025-02-17 RX ORDER — BUSPIRONE HYDROCHLORIDE 10 MG/1
20 TABLET ORAL 2 TIMES DAILY
Qty: 360 TABLET | Refills: 3 | Status: SHIPPED | OUTPATIENT
Start: 2025-02-17 | End: 2026-02-17

## 2025-02-17 NOTE — PROGRESS NOTES
Subjective:       Patient ID: Alejandro Mancia is a 29 y.o. male.      CHIEF COMPLAINT:  - Alejandro presents for medication refills and follow-up on recent illness.    HPI:  - Alejandro reports recent recovery from a viral illness for which he was previously prescribed Robitussin. His wife is beginning to exhibit similar symptoms. His anxiety has been well-managed with the current dose of Buspar since its increase. His use of Xanax has decreased significantly from every other day to not taking it in at least a week. He still has approximately 12 tablets left from his initial prescription. The Cialis he was prescribed is working effectively. He has an upcoming appointment with an ear, nose, and throat specialist for ear cleaning, scheduled for the following day.  - He denies any sleep issues.    MEDICATIONS:  - Buspar, increased dose, for anxiety  - Xanax, as needed for anxiety  - Cialis    MEDICAL HISTORY:  - Anxiety         Review of Systems   Constitutional:  Negative for chills, fatigue, fever and unexpected weight change.   HENT:  Negative for congestion, dental problem, ear pain, hearing loss, rhinorrhea and trouble swallowing.    Eyes:  Negative for pain and visual disturbance.   Respiratory:  Negative for cough and shortness of breath.    Cardiovascular:  Negative for chest pain, palpitations and leg swelling.   Gastrointestinal:  Negative for abdominal distention, abdominal pain, blood in stool, constipation, diarrhea, nausea and vomiting.   Genitourinary:  Negative for difficulty urinating.   Musculoskeletal:  Negative for arthralgias and myalgias.   Skin:  Negative for rash.   Neurological:  Negative for dizziness, weakness, numbness and headaches.   Hematological:  Negative for adenopathy. Does not bruise/bleed easily.   Psychiatric/Behavioral:  Negative for agitation, dysphoric mood and sleep disturbance. The patient is nervous/anxious (improved).        Objective:      Physical Exam  Vitals reviewed.    Constitutional:       General: He is not in acute distress.     Appearance: Normal appearance. He is well-developed. He is obese. He is not ill-appearing or toxic-appearing.   HENT:      Head: Normocephalic and atraumatic.   Eyes:      General: Lids are normal. No scleral icterus.     Extraocular Movements: Extraocular movements intact.      Conjunctiva/sclera: Conjunctivae normal.      Pupils: Pupils are equal, round, and reactive to light.   Cardiovascular:      Rate and Rhythm: Normal rate and regular rhythm.      Heart sounds: Normal heart sounds. No murmur heard.     No friction rub. No gallop.   Pulmonary:      Effort: Pulmonary effort is normal.      Breath sounds: Normal breath sounds. No decreased breath sounds, wheezing, rhonchi or rales.   Neurological:      General: No focal deficit present.      Mental Status: He is alert and oriented to person, place, and time. Mental status is at baseline.      Cranial Nerves: No cranial nerve deficit.      Gait: Gait normal.   Psychiatric:         Mood and Affect: Mood and affect normal.         Behavior: Behavior normal.         Thought Content: Thought content normal.         Judgment: Judgment normal.         Assessment:       1. Anxiety and depression    2. Erectile dysfunction, unspecified erectile dysfunction type    3. Encounter for long-term (current) use of medications    4. Severe obesity (BMI 35.0-39.9) with comorbidity    5. Essential hypertension    6. Mixed hyperlipidemia        Plan:   1. Anxiety and depression  -     ALPRAZolam (XANAX) 0.25 MG tablet; Take 1 tablet (0.25 mg total) by mouth 3 (three) times daily as needed for Anxiety.  Dispense: 45 tablet; Refill: 0  -     busPIRone (BUSPAR) 10 MG tablet; Take 2 tablets (20 mg total) by mouth 2 (two) times daily.  Dispense: 360 tablet; Refill: 3    2. Erectile dysfunction, unspecified erectile dysfunction type  -     tadalafiL (CIALIS) 10 MG tablet; Take 1 tablet (10 mg total) by mouth daily as  needed for Erectile Dysfunction.  Dispense: 90 tablet; Refill: 1    3. Encounter for long-term (current) use of medications  -     Comprehensive Metabolic Panel; Future; Expected date: 07/28/2025  -     Lipid Panel; Future; Expected date: 07/28/2025  -     TSH; Future; Expected date: 07/28/2025  -     CBC Auto Differential; Future; Expected date: 07/28/2025  -     Hemoglobin A1C; Future; Expected date: 07/28/2025    4. Severe obesity (BMI 35.0-39.9) with comorbidity  -     Hemoglobin A1C; Future; Expected date: 07/28/2025    5. Essential hypertension  -     Comprehensive Metabolic Panel; Future; Expected date: 07/28/2025  -     TSH; Future; Expected date: 07/28/2025    6. Mixed hyperlipidemia  -     Comprehensive Metabolic Panel; Future; Expected date: 07/28/2025  -     Lipid Panel; Future; Expected date: 07/28/2025        Assessment & Plan    IMPRESSION:  - Assessed patient's response to Robitussin for recent viral illness; symptoms improved  - Evaluated effectiveness of anxiety management regimen, noting reduced need for Xanax  - Continued Buspar at current dose as it appears to be effectively managing anxiety  - Considered long-term anxiety management strategy, aiming to minimize Xanax use  - Reviewed sleep quality, which patient reports as satisfactory    ANXIETY DISORDER:   Explained the rationale for minimizing long-term Xanax use due to potential for addiction and side effects.   Discussed the goal of using Buspar as the primary anxiety management medication, with Xanax reserved for severe anxiety attacks.   Noted that the patient reports decreased use of Xanax, having not taken it in at least a week, with approximately 12 pills remaining from the first prescription.   Evaluated that Buspar has been helping since the dose was increased, with Xanax usage decreasing from every other day to about once a week.   Assessed that the patient is on the right path with decreased Xanax usage.   Continued Buspar at the  current dose and provided a 90-day refill.   Refilled Xanax with instructions to use only for severe anxiety attacks; provided 1 additional refill.   Instructed the patient to follow up if anxiety symptoms worsen or if there are any concerns about medication effectiveness.    ERECTILE DYSFUNCTION:   Evaluated that the patient confirms Cialis is working effectively.   Continued Cialis and provided a 90-day refill.   Advised the patient to report any side effects or changes in medication efficacy.             Please follow up in 6 months (in late July or early August) for 6 month f/u In-person with Dr. Melendez.  Labs: Fasting lab PTA  Additional orders: Print AVS    This note was generated with the assistance of ambient listening technology. Verbal consent was obtained by the patient and accompanying visitor(s) for the recording of patient appointment to facilitate this note. I attest to having reviewed and edited the generated note for accuracy, though some syntax or spelling errors may persist. Please contact the author of this note for any clarification.      Visit today included increased complexity associated with the care of the episodic problem hypertension, which was addressed while instituting co-management of the longitudinal care of the patient due to the serious and/or complex managed problem(s) .    I have evaluated and discussed management associated with medical care services that serve as the continuing focal point for all needed health care services and/or with medical care services that are part of ongoing care related to my patient's single, serious condition or a complex condition(s).    I am providing ongoing care and I am the primary care provider for this patient, and they are being managed, monitored, and/or observed for their chronic conditions over time.     I have addressed their ongoing health maintenance requirements and needs for all health care services and reviewed co-management plans  provided by specialty providers when available.    Health Maintenance Due   Topic Date Due    COVID-19 Vaccine (4 - 2024-25 season) 09/01/2024

## 2025-02-18 ENCOUNTER — OFFICE VISIT (OUTPATIENT)
Dept: OTOLARYNGOLOGY | Facility: CLINIC | Age: 30
End: 2025-02-18
Payer: COMMERCIAL

## 2025-02-18 VITALS — WEIGHT: 209.69 LBS | BODY MASS INDEX: 32.84 KG/M2

## 2025-02-18 DIAGNOSIS — H61.23 BILATERAL IMPACTED CERUMEN: ICD-10-CM

## 2025-02-18 PROCEDURE — 4010F ACE/ARB THERAPY RXD/TAKEN: CPT | Mod: CPTII,S$GLB,, | Performed by: STUDENT IN AN ORGANIZED HEALTH CARE EDUCATION/TRAINING PROGRAM

## 2025-02-18 PROCEDURE — 3008F BODY MASS INDEX DOCD: CPT | Mod: CPTII,S$GLB,, | Performed by: STUDENT IN AN ORGANIZED HEALTH CARE EDUCATION/TRAINING PROGRAM

## 2025-02-18 NOTE — PROGRESS NOTES
REFERRING PROVIDER  [facility]  [address] [place] [address]  Subjective:   Patient: Alejandro Mancia [id number], :1995   Visit date:2025 3:03 PM    Chief Complaint:  Cerumen Impaction        HPI:     Alejandro Mancia is a 29 y.o. male whom I am asked to see for evaluation of progressively worsening hearing loss in both ears for the past few weks.   Alejandro rates the severity as moderate.  No exacerbating or relieving factors.  There is no drainage from the ears.      His meds, allergies, medical, surgical, social & family histories were reviewed & updated:  -     He has a current medication list which includes the following prescription(s): alprazolam, buspirone, losartan, and tadalafil.  -     He  has a past medical history of ADHD (attention deficit hyperactivity disorder), Anxiety, and Hypertension.   -     He does not have any pertinent problems on file.   -     He  has a past surgical history that includes Small intestine surgery.  -     He  reports that he has never smoked. He has never used smokeless tobacco. He reports that he does not drink alcohol and does not use drugs.  -     His family history includes Cancer in his maternal uncle; Hypertension in his maternal grandmother and paternal grandfather.  -     He has no known allergies.      Review of Systems:  -     Allergic/Immunologic: has no known allergies..  -     Constitutional: Current temp:          Objective:     Physical Exam:  Vitals:  Wt 95.1 kg (209 lb 10.5 oz)   BMI 32.84 kg/m²   Communication:  Able to communicate, no hoarseness.  Head & Face:  Normocephalic, atraumatic, no sinus tenderness.  Eyes:  Extraocular motions intact.  Ears:  Otoscopy of external auditory canals reveals impaction of bilateral ear canals.  With the patient in the supine position, we used the operating microscope to examine both ears with the appropriate sized ear speculum.  A variety of sterile, micro-instruments were  utilized to remove the cerumen atraumatically from the impacted ear(s).   After removal, the ears were reexamined-  Right Ear:  No mass/lesion of auricle. The external auditory canals is without erythema or discharge. Pneumatic otoscopy of the tympanic membrane revealed no perforation, with no fluid in middle ear. Clinical speech reception thresholds grossly normal.  Left Ear:  No mass/lesion of auricle. The external auditory canals is without erythema or discharge. Pneumatic otoscopy of the tympanic membrane revealed no perforation, with no fluid in middle ear. Clinical speech reception thresholds grossly normal  Nose:  No masses/lesions of external nose, nasal mucosa, septum, and turbinates were within normal limits.  Mouth:  No mass/lesion of lips, teeth, gums, hard/soft palate, tongue, tonsils, or oropharynx.  Neck & Lymphatics:  No cervical lymphadenopathy, no neck mass/crepitus/ asymmetry, trachea is midline, no thyroid enlargement/tenderness/mass.  Neuro/Psych: Alert with normal mood and affect.   Respiration/Chest:  Symmetric expansion during respiration, normal respiratory effort.  Skin:  Warm and intact.    Assessment & Plan:       -     Cerumen Impaction - Alejandro has cerumen impaction.  We discussed preventative measures and treatment options.  Q-tips must be avoided, instead the ears can be cleaned with OTC ear rinses (or a mixture of alcohol & vinegar in equal parts).   For hard wax, Alejandro may place mineral oil/baby oil in the ear with a cotton ball at night and remove in the shower.  This will assist in softening the wax and allow it to drain out on its own. If the cerumen impacts the ear canal and causes hearing loss or infection he needs to follow-up in the clinic for treatment and cleaning.

## 2025-03-23 DIAGNOSIS — N52.9 ERECTILE DYSFUNCTION, UNSPECIFIED ERECTILE DYSFUNCTION TYPE: ICD-10-CM

## 2025-03-24 NOTE — TELEPHONE ENCOUNTER
Refill Routing Note   Medication(s) are not appropriate for processing by [facility] for the following reason(s):        No active prescription written by provider    [facility] action(s):  Defer               Appointments  past 12m or future 3m with PCP    Date Provider   Last Visit   7/9/2024 Antonietta Melendez MD   Next Visit   8/4/2025 Antonietta Melendez MD   ED visits in past 90 days: 0        Note composed:12:33 PM 03/24/2025

## 2025-03-25 RX ORDER — TADALAFIL 10 MG/1
10 TABLET ORAL DAILY PRN
Qty: 90 TABLET | Refills: 4 | Status: SHIPPED | OUTPATIENT
Start: 2025-03-25

## 2025-03-30 ENCOUNTER — OFFICE VISIT (OUTPATIENT)
Dept: URGENT CARE | Facility: CLINIC | Age: 30
End: 2025-03-30
Payer: COMMERCIAL

## 2025-03-30 VITALS
WEIGHT: 206.19 LBS | HEART RATE: 98 BPM | RESPIRATION RATE: 18 BRPM | SYSTOLIC BLOOD PRESSURE: 110 MMHG | BODY MASS INDEX: 32.36 KG/M2 | HEIGHT: 67 IN | OXYGEN SATURATION: 97 % | DIASTOLIC BLOOD PRESSURE: 74 MMHG | TEMPERATURE: 99 F

## 2025-03-30 DIAGNOSIS — J02.9 SORE THROAT: ICD-10-CM

## 2025-03-30 DIAGNOSIS — J02.0 STREP PHARYNGITIS: Primary | ICD-10-CM

## 2025-03-30 LAB
CTP QC/QA: YES
MOLECULAR STREP A: POSITIVE

## 2025-03-30 PROCEDURE — 87651 STREP A DNA AMP PROBE: CPT | Mod: QW,S$GLB,, | Performed by: NURSE PRACTITIONER

## 2025-03-30 PROCEDURE — 99214 OFFICE O/P EST MOD 30 MIN: CPT | Mod: S$GLB,,, | Performed by: NURSE PRACTITIONER

## 2025-03-30 RX ORDER — AMOXICILLIN 500 MG/1
500 CAPSULE ORAL EVERY 12 HOURS
Qty: 20 CAPSULE | Refills: 0 | Status: SHIPPED | OUTPATIENT
Start: 2025-03-30 | End: 2025-04-09

## 2025-03-30 RX ORDER — LIDOCAINE HYDROCHLORIDE 20 MG/ML
SOLUTION OROPHARYNGEAL EVERY 6 HOURS PRN
Qty: 100 ML | Refills: 1 | Status: SHIPPED | OUTPATIENT
Start: 2025-03-30 | End: 2025-04-09

## 2025-03-30 NOTE — PROGRESS NOTES
"Subjective:      Patient ID: Alejandro Mancia is a 29 y.o. male.    Vitals:  height is 5' 7" (1.702 m) and weight is 93.5 kg (206 lb 3.2 oz). His oral temperature is 98.9 °F (37.2 °C). His blood pressure is 110/74 and his pulse is 98. His respiration is 18 and oxygen saturation is 97%.     Chief Complaint: Sore Throat    Patient stated that his wife tested positive yesterday for strep and his throat started hurting yesterday.     Sore Throat   This is a new problem. The current episode started yesterday. The problem has been gradually worsening. The pain is worse on the left side. There has been no fever. The pain is at a severity of 6/10. The pain is mild. Associated symptoms include shortness of breath. Pertinent negatives include no congestion, coughing or trouble swallowing. He has had exposure to strep. He has tried acetaminophen for the symptoms. The treatment provided no relief.       HENT:  Positive for sore throat. Negative for congestion and trouble swallowing.    Respiratory:  Positive for shortness of breath. Negative for cough.       Objective:     Vitals:    03/30/25 0912   BP: 110/74   BP Location: Left arm   Patient Position: Sitting   Pulse: 98   Resp: 18   Temp: 98.9 °F (37.2 °C)   TempSrc: Oral   SpO2: 97%   Weight: 93.5 kg (206 lb 3.2 oz)   Height: 5' 7" (1.702 m)       Physical Exam   Constitutional: He is oriented to person, place, and time. He appears well-developed. He is cooperative.  Non-toxic appearance. He does not appear ill. No distress.   HENT:   Head: Normocephalic and atraumatic.   Ears:   Right Ear: Hearing, tympanic membrane, external ear and ear canal normal.   Left Ear: Hearing, tympanic membrane, external ear and ear canal normal.   Nose: Nose normal. No mucosal edema, rhinorrhea or nasal deformity. No epistaxis. Right sinus exhibits no maxillary sinus tenderness and no frontal sinus tenderness. Left sinus exhibits no maxillary sinus tenderness and no frontal sinus tenderness. "   Mouth/Throat: Uvula is midline and mucous membranes are normal. Mucous membranes are moist. No trismus in the jaw. Normal dentition. No uvula swelling. Posterior oropharyngeal erythema present. No posterior oropharyngeal edema.   Eyes: Conjunctivae and lids are normal. Pupils are equal, round, and reactive to light. No scleral icterus. Extraocular movement intact   Neck: Trachea normal and phonation normal. Neck supple. No edema present. No erythema present. No neck rigidity present.   Cardiovascular: Normal rate, regular rhythm, normal heart sounds and normal pulses.   Pulmonary/Chest: Effort normal and breath sounds normal. No respiratory distress. He has no decreased breath sounds. He has no rhonchi.   Abdominal: Normal appearance and bowel sounds are normal. He exhibits no distension and no mass. Soft. There is no abdominal tenderness.   Musculoskeletal: Normal range of motion.         General: No deformity. Normal range of motion.   Lymphadenopathy:     He has cervical adenopathy.   Neurological: no focal deficit. He is alert and oriented to person, place, and time. He exhibits normal muscle tone. Coordination normal.   Skin: Skin is warm, dry, intact, not diaphoretic and not pale. Capillary refill takes less than 2 seconds.   Psychiatric: His speech is normal and behavior is normal. Judgment and thought content normal.   Nursing note and vitals reviewed.      Assessment:     1. Strep pharyngitis    2. Sore throat      Results for orders placed or performed in visit on 03/30/25   POCT Strep A, Molecular    Collection Time: 03/30/25  9:31 AM   Result Value Ref Range    Molecular Strep A, POC Positive (A) Negative     Acceptable Yes        Plan:       Strep pharyngitis  -     amoxicillin (AMOXIL) 500 MG capsule; Take 1 capsule (500 mg total) by mouth every 12 (twelve) hours. for 10 days  Dispense: 20 capsule; Refill: 0  -     LIDOcaine viscous HCl 2% (LIDOCAINE VISCOUS) 2 % Soln; by Mucous  Membrane route every 6 (six) hours as needed (oral gum throat  pain). May apply with swab or swish 5ml PO and expectorate as needed for oral mouth throat pain  Dispense: 100 mL; Refill: 1    Sore throat  -     POCT Strep A, Molecular  -     LIDOcaine viscous HCl 2% (LIDOCAINE VISCOUS) 2 % Soln; by Mucous Membrane route every 6 (six) hours as needed (oral gum throat  pain). May apply with swab or swish 5ml PO and expectorate as needed for oral mouth throat pain  Dispense: 100 mL; Refill: 1        Patient Instructions   Increase fluids   Rest activity ad miley   Complete antibiotic as prescribed FULL 10 DAYS   Tylenol or ibuprofen per package directions as needed for fever, sore throat pain and body aches   Discard toothbrush TUESDAY  evening; start new toothbrush WEDNESDAY  AM   Warm salt water gargles OR lidocaine viscous gargle as prescribed   F/U as needed      No follow-ups on file.

## 2025-03-30 NOTE — PATIENT INSTRUCTIONS
Increase fluids   Rest activity ad miley   Complete antibiotic as prescribed FULL 10 DAYS   Tylenol or ibuprofen per package directions as needed for fever, sore throat pain and body aches   Discard toothbrush TUESDAY  evening; start new toothbrush WEDNESDAY  AM   Warm salt water gargles OR lidocaine viscous gargle as prescribed   F/U as needed

## 2025-04-02 ENCOUNTER — OFFICE VISIT (OUTPATIENT)
Dept: INTERNAL MEDICINE | Facility: CLINIC | Age: 30
End: 2025-04-02
Payer: COMMERCIAL

## 2025-04-02 VITALS
DIASTOLIC BLOOD PRESSURE: 84 MMHG | BODY MASS INDEX: 31.28 KG/M2 | HEART RATE: 97 BPM | WEIGHT: 199.31 LBS | RESPIRATION RATE: 18 BRPM | HEIGHT: 67 IN | SYSTOLIC BLOOD PRESSURE: 128 MMHG | OXYGEN SATURATION: 98 %

## 2025-04-02 DIAGNOSIS — F41.9 ANXIETY AND DEPRESSION: Primary | ICD-10-CM

## 2025-04-02 DIAGNOSIS — F32.A ANXIETY AND DEPRESSION: Primary | ICD-10-CM

## 2025-04-02 PROCEDURE — G2211 COMPLEX E/M VISIT ADD ON: HCPCS | Mod: S$GLB,,, | Performed by: FAMILY MEDICINE

## 2025-04-02 PROCEDURE — 3008F BODY MASS INDEX DOCD: CPT | Mod: CPTII,S$GLB,, | Performed by: FAMILY MEDICINE

## 2025-04-02 PROCEDURE — 3074F SYST BP LT 130 MM HG: CPT | Mod: CPTII,S$GLB,, | Performed by: FAMILY MEDICINE

## 2025-04-02 PROCEDURE — 1159F MED LIST DOCD IN RCRD: CPT | Mod: CPTII,S$GLB,, | Performed by: FAMILY MEDICINE

## 2025-04-02 PROCEDURE — 99214 OFFICE O/P EST MOD 30 MIN: CPT | Mod: S$GLB,,, | Performed by: FAMILY MEDICINE

## 2025-04-02 PROCEDURE — 3079F DIAST BP 80-89 MM HG: CPT | Mod: CPTII,S$GLB,, | Performed by: FAMILY MEDICINE

## 2025-04-02 PROCEDURE — 4010F ACE/ARB THERAPY RXD/TAKEN: CPT | Mod: CPTII,S$GLB,, | Performed by: FAMILY MEDICINE

## 2025-04-02 PROCEDURE — 99999 PR PBB SHADOW E&M-EST. PATIENT-LVL IV: CPT | Mod: PBBFAC,,, | Performed by: FAMILY MEDICINE

## 2025-04-02 PROCEDURE — 1160F RVW MEDS BY RX/DR IN RCRD: CPT | Mod: CPTII,S$GLB,, | Performed by: FAMILY MEDICINE

## 2025-04-02 RX ORDER — BUPROPION HYDROCHLORIDE 150 MG/1
150 TABLET ORAL DAILY
Qty: 30 TABLET | Refills: 12 | Status: SHIPPED | OUTPATIENT
Start: 2025-04-02 | End: 2026-04-02

## 2025-04-02 RX ORDER — BUSPIRONE HYDROCHLORIDE 10 MG/1
10 TABLET ORAL 2 TIMES DAILY
Start: 2025-04-02 | End: 2026-04-02

## 2025-04-02 NOTE — PROGRESS NOTES
"Subjective:       Patient ID: Alejandro Mancia is a 29 y.o. male.    CHIEF COMPLAINT:  - Patient presents with worsening anxiety despite an increase in Buspar dosage.    HPI:  - Patient reports significant worsening of anxiety after increasing his Buspar dosage from 10 mg twice daily to 20 mg twice daily, with no improvement in symptoms. He has been attending therapy consistently once a week for about 3 months. His therapist has discussed breathing exercises and other coping strategies with him. Patient describes his current anxiety as more intense than before and acknowledges personal and family issues contributing to his increased anxiety. He is scheduled to see his therapist again tomorrow.  - Patient denies thoughts of self-harm.    ROS:  General: -fever, -chills, -fatigue, -weight gain, -weight loss  Eyes: -eye pain, -vision change  ENMT: -hearing loss, -ear pain, -nasal congestion, -rhinorrhea, -dental problem, -trouble swallowing, +SORE THROAT (being treated for strep, which is improving)  Cardiovascular: -chest pain, -palpitations, -lower extremity edema  Respiratory: -cough, -trouble breathing  Gastrointestinal: -abdominal pain, -abdominal swelling, -nausea, -vomiting, -diarrhea, -constipation, -blood in stool  Genitourinary: -difficulty urinating, -genital problem  Musculoskeletal: -joint pain, -muscle aches  Integumentary: -rash  Lymphatics: -swollen glands, -easy bruising  Neurologic: -headache, -dizziness, -numbness, -weakness  Psychiatric: +ANXIETY, -depression, -sleep difficulty, +NEW OR WORSENING MOOD CHANGES     Objective:     Vitals:    04/02/25 1344   BP: 128/84   Pulse: 97   Resp: 18   SpO2: 98%   Weight: 90.4 kg (199 lb 4.7 oz)   Height: 5' 7" (1.702 m)          Physical Exam  Vitals reviewed.   Constitutional:       General: He is not in acute distress.     Appearance: He is well-developed.   HENT:      Head: Normocephalic and atraumatic.   Eyes:      General: Lids are normal. No scleral " icterus.     Extraocular Movements: Extraocular movements intact.      Conjunctiva/sclera: Conjunctivae normal.      Pupils: Pupils are equal, round, and reactive to light.   Pulmonary:      Effort: Pulmonary effort is normal.   Neurological:      Mental Status: He is alert and oriented to person, place, and time.      Cranial Nerves: No cranial nerve deficit.      Gait: Gait normal.   Psychiatric:         Mood and Affect: Mood and affect normal.           Assessment:       1. Anxiety and depression        Plan:   1. Anxiety and depression  -     buPROPion (WELLBUTRIN XL) 150 MG TB24 tablet; Take 1 tablet (150 mg total) by mouth once daily.  Dispense: 30 tablet; Refill: 12  -     busPIRone (BUSPAR) 10 MG tablet; Take 1 tablet (10 mg total) by mouth 2 (two) times daily.    After discussion we have agreed to decrease BuSpar back to 10 mg twice daily and add Wellbutrin 150 mg daily to treat his anxiety and depression.  Encouraged patient to continue therapy.  Discussed risks/benefits of Wellbutrin.  Patient denies heavy drinking or history of seizure.  Provided resources on meditation and breathing exercises to help with coping.  Patient will follow-up in 1 month for reassessment.    Patient expressed understanding and agreement with plan.    Visit today included increased complexity associated with the care of the episodic problem anxiety, which was addressed while instituting co-management of the longitudinal care of the patient due to the serious and/or complex managed problem(s) .    I have evaluated and discussed management associated with medical care services that serve as the continuing focal point for all needed health care services and/or with medical care services that are part of ongoing care related to my patient's single, serious condition or a complex condition(s).    I am providing ongoing care and I am the primary care provider for this patient, and they are being managed, monitored, and/or observed  for their chronic conditions over time.     I have addressed their ongoing health maintenance requirements and needs for all health care services and reviewed co-management plans provided by specialty providers when available.    There are no preventive care reminders to display for this patient.      Follow up in 1 month (on 5/2/2025), or if symptoms worsen or fail to improve, for follow up for anxiety with Dr. Melendez.    This note was generated with the assistance of ambient listening technology. Verbal consent was obtained by the patient and accompanying visitor(s) for the recording of patient appointment to facilitate this note. I attest to having reviewed and edited the generated note for accuracy, though some syntax or spelling errors may persist. Please contact the author of this note for any clarification.

## 2025-04-02 NOTE — PATIENT INSTRUCTIONS
Try 3-5 minutes daily of guided meditation  Headspace - has free 10 day introduction  Simply Being  Calm  Simple Habit     When you feel yourself getting anxious/stressed take a breathing time out. Breathe in to count of 4, hold for count of 2, breathe out for count of 8; repeat 10 times and it should help you feel more calm.     Pillar-Booklet.pdf (lifestylemedicine.org) I recommend you start with pillar # 3  Whole Food, Plant-Based Nutrition  Physical Activity  Stress Management  Avoidance of Risky Substances  Restorative Sleep  Social Connection

## 2025-04-30 DIAGNOSIS — N52.9 ERECTILE DYSFUNCTION, UNSPECIFIED ERECTILE DYSFUNCTION TYPE: ICD-10-CM

## 2025-04-30 RX ORDER — TADALAFIL 10 MG/1
10 TABLET ORAL DAILY PRN
Qty: 90 TABLET | Refills: 0 | Status: SHIPPED | OUTPATIENT
Start: 2025-04-30

## 2025-04-30 NOTE — TELEPHONE ENCOUNTER
Refill Routing Note   Medication(s) are not appropriate for processing by [facility] for the following reason(s):        No active prescription written by provider    [facility] action(s):  Defer             Appointments  past 12m or future 3m with PCP    Date Provider   Last Visit   4/2/2025 Antonietta Melendez MD   Next Visit   5/14/2025 Antonietta Melendez MD   ED visits in past 90 days: 0        Note composed:9:44 AM 04/30/2025

## 2025-06-22 ENCOUNTER — OFFICE VISIT (OUTPATIENT)
Dept: URGENT CARE | Facility: CLINIC | Age: 30
End: 2025-06-22
Payer: COMMERCIAL

## 2025-06-22 VITALS
RESPIRATION RATE: 20 BRPM | TEMPERATURE: 99 F | HEART RATE: 104 BPM | SYSTOLIC BLOOD PRESSURE: 133 MMHG | OXYGEN SATURATION: 95 % | BODY MASS INDEX: 33.03 KG/M2 | HEIGHT: 67 IN | DIASTOLIC BLOOD PRESSURE: 83 MMHG | WEIGHT: 210.44 LBS

## 2025-06-22 DIAGNOSIS — J02.0 PHARYNGITIS DUE TO STREPTOCOCCUS SPECIES: Primary | ICD-10-CM

## 2025-06-22 LAB
CTP QC/QA: YES
MOLECULAR STREP A: POSITIVE

## 2025-06-22 PROCEDURE — 87651 STREP A DNA AMP PROBE: CPT | Mod: QW,S$GLB,, | Performed by: NURSE PRACTITIONER

## 2025-06-22 PROCEDURE — 99214 OFFICE O/P EST MOD 30 MIN: CPT | Mod: S$GLB,,, | Performed by: NURSE PRACTITIONER

## 2025-06-22 RX ORDER — AMOXICILLIN 875 MG/1
875 TABLET, COATED ORAL EVERY 12 HOURS
Qty: 20 TABLET | Refills: 0 | Status: SHIPPED | OUTPATIENT
Start: 2025-06-22 | End: 2025-07-02

## 2025-06-22 RX ORDER — LIDOCAINE HYDROCHLORIDE 20 MG/ML
SOLUTION OROPHARYNGEAL EVERY 6 HOURS PRN
Qty: 100 ML | Refills: 1 | Status: SHIPPED | OUTPATIENT
Start: 2025-06-22 | End: 2025-07-02

## 2025-06-22 NOTE — PATIENT INSTRUCTIONS
Increase fluids   Rest activity ad miley   Complete antibiotic as prescribed FULL 10 DAYS  Tylenol or ibuprofen per package directions as needed for fever, sore throat pain and body aches   Discard toothbrush Tuesday  evening; start new toothbrush Wednesday AM   Warm salt water gargles   F/U as needed

## 2025-06-22 NOTE — LETTER
June 22, 2025      [facility], [place]  [address], [address]  [facility] [address]  Phone: [phone]  Fax: [phone]       Patient: Alejandro Mancia   YOB: 1995  Date of Visit: 06/22/2025    To Whom It May Concern:    Goldy Mancia  was at [facility] on 06/22/2025. The patient may return to work/school on 06/24/2025 with no restrictions. If you have any questions or concerns, or if I can be of further assistance, please do not hesitate to contact me.    Sincerely,          Power Cooley, NP

## 2025-07-25 ENCOUNTER — OFFICE VISIT (OUTPATIENT)
Dept: URGENT CARE | Facility: CLINIC | Age: 30
End: 2025-07-25
Payer: COMMERCIAL

## 2025-07-25 VITALS
WEIGHT: 205 LBS | DIASTOLIC BLOOD PRESSURE: 81 MMHG | OXYGEN SATURATION: 97 % | BODY MASS INDEX: 32.18 KG/M2 | HEIGHT: 67 IN | HEART RATE: 93 BPM | RESPIRATION RATE: 18 BRPM | TEMPERATURE: 99 F | SYSTOLIC BLOOD PRESSURE: 125 MMHG

## 2025-07-25 DIAGNOSIS — Z87.09 HISTORY OF STREP PHARYNGITIS: ICD-10-CM

## 2025-07-25 DIAGNOSIS — R52 BODY ACHES: ICD-10-CM

## 2025-07-25 DIAGNOSIS — J02.0 STREP PHARYNGITIS: ICD-10-CM

## 2025-07-25 DIAGNOSIS — Z20.818 EXPOSURE TO STREP THROAT: Primary | ICD-10-CM

## 2025-07-25 LAB
CTP QC/QA: YES
MOLECULAR STREP A: POSITIVE

## 2025-07-25 RX ORDER — PENICILLIN V POTASSIUM 500 MG/1
500 TABLET, FILM COATED ORAL 2 TIMES DAILY
Qty: 20 TABLET | Refills: 0 | Status: SHIPPED | OUTPATIENT
Start: 2025-07-25 | End: 2025-08-04

## 2025-07-25 NOTE — PATIENT INSTRUCTIONS
Pharyngitis   Reviewed positive strep test with patient who verbalized understanding.  We discussed treatment with an antibiotic to cover the strep throat infection as well as at home remedies to help with current symptoms and over-the-counter medications that can also help with diagnosis.  Patient educational handouts also included with discharge instructions for patient who verbalized understanding agrees with plan of care.  Patient denies any further questions or concerns at this time.  Patient exits exam room in no acute distress.      If your condition worsens or fails to improve we recommend that you receive another evaluation at the ER immediately or contact your PCP to discuss your concerns or return here. You must understand that you've received an urgent care treatment only and that you may be released before all your medical problems are known or treated. You the patient will arrange for followup care as instructed.   The majority of all sore throats or tonsillitis are viral and antibiotics will not treat this.     Strep test Positive:  - Complete the full course of antibiotics given  - Get plenty of rest and Drink plenty of cool liquids while avoid any beverage or food that can irritate your throat (acidic, spicy or salty foods).  - Gargle salt water  - Use over the counter Cepacol to soothe throat symptoms  - Tylenol or ibuprofen for pain may help as long as you are not allergic to these meds or have a medical condition such as stomach ulcers, liver or kidney disease or taking blood thinners etc that would prevent you from using these medications.   - Throw away your toothbrush now and when you complete your antibiotics.

## 2025-07-25 NOTE — PROGRESS NOTES
"Subjective:      Patient ID: Alejandro Mancia is a 30 y.o. male.    Vitals:  height is 5' 7" (1.702 m) and weight is 93 kg (205 lb). His oral temperature is 98.5 °F (36.9 °C). His blood pressure is 125/81 and his pulse is 93. His respiration is 18 and oxygen saturation is 97%.     Chief Complaint: Sore Throat    30-year-old male patient presents to the clinic with complaints of exposure to strep throat.  Patient states his wife tested positive yesterday.  He had strep throat a month ago and recalls finishing his antibiotics as directed and changing out his toothbrush.  He states his wife does work in healthcare in his a nurse in his ran a lot of sick contacts.  Patient states he is unsure if he ran a fever last night but has been taking Tylenol around the clock.  Patient is complaining of sore throat.  Denies any ear pain, ear drainage, cough.  Having mild body aches.  No other symptoms today.  No known drug allergies.      Sore Throat   The current episode started yesterday. Maximum temperature: unknown fever, felt feverish. The pain is at a severity of 3/10. Pertinent negatives include no abdominal pain, congestion, coughing, ear discharge, ear pain, shortness of breath, trouble swallowing or vomiting. Associated symptoms comments: Body aches, nausea. He has had exposure to strep. He has tried acetaminophen for the symptoms.       Constitution: Negative for chills, sweating and fever.   HENT:  Positive for sore throat. Negative for ear pain, ear discharge, congestion, trouble swallowing and voice change.    Neck: Negative for neck stiffness.   Eyes:  Negative for eye discharge, eye itching and eye redness.   Respiratory:  Negative for cough and shortness of breath.    Gastrointestinal:  Negative for abdominal pain and vomiting.   Musculoskeletal:  Positive for muscle ache.   Skin:  Negative for rash.   Allergic/Immunologic: Negative for sneezing.      Objective:     Vitals:    07/25/25 1050   BP: 125/81   Pulse: " 93   Resp: 18   Temp: 98.5 °F (36.9 °C)       Physical Exam   Constitutional: He is oriented to person, place, and time. He appears well-developed. He is cooperative.  Non-toxic appearance. He does not appear ill. No distress.   HENT:   Head: Normocephalic and atraumatic.   Ears:   Right Ear: Hearing, tympanic membrane, external ear and ear canal normal. Tympanic membrane is not erythematous and not bulging. no impacted cerumen  Left Ear: Hearing, tympanic membrane, external ear and ear canal normal. Tympanic membrane is not erythematous and not bulging. no impacted cerumen  Nose: Nose normal. No mucosal edema, rhinorrhea or nasal deformity. No epistaxis. Right sinus exhibits no maxillary sinus tenderness and no frontal sinus tenderness. Left sinus exhibits no maxillary sinus tenderness and no frontal sinus tenderness.   Mouth/Throat: Uvula is midline and mucous membranes are normal. Mucous membranes are moist. No trismus in the jaw. Normal dentition. No uvula swelling. Posterior oropharyngeal erythema present. No oropharyngeal exudate, posterior oropharyngeal edema or tonsillar abscesses. Tonsils are 2+ on the right. Tonsils are 2+ on the left. Tonsillar exudate.   Eyes: Conjunctivae and lids are normal. Right eye exhibits no discharge. Left eye exhibits no discharge. No scleral icterus.   Neck: Trachea normal and phonation normal. Neck supple. No edema present. No erythema present. No neck rigidity present.   Cardiovascular: Normal rate, regular rhythm, normal heart sounds and normal pulses.   Pulmonary/Chest: Effort normal and breath sounds normal. No accessory muscle usage or stridor. No tachypnea. No respiratory distress. He has no decreased breath sounds. He has no wheezes. He has no rhonchi. He has no rales.   Abdominal: Normal appearance.   Musculoskeletal: Normal range of motion.         General: No deformity. Normal range of motion.   Neurological: He is alert and oriented to person, place, and time. He  exhibits normal muscle tone. Coordination normal.   Skin: Skin is warm, dry, intact, not diaphoretic, not pale and no rash.   Psychiatric: His speech is normal and behavior is normal. Judgment and thought content normal.   Nursing note and vitals reviewed.      Assessment:     1. Exposure to strep throat    2. Strep pharyngitis    3. Body aches    4. History of strep pharyngitis      Results for orders placed or performed in visit on 07/25/25   POCT Strep A, Molecular    Collection Time: 07/25/25 10:53 AM   Result Value Ref Range    Molecular Strep A, POC Positive (A) Negative     Acceptable Yes        Plan:       Exposure to strep throat  -     POCT Strep A, Molecular    Strep pharyngitis  -     penicillin v potassium (VEETID) 500 MG tablet; Take 1 tablet (500 mg total) by mouth 2 (two) times a day. for 10 days  Dispense: 20 tablet; Refill: 0    Body aches    History of strep pharyngitis          Medical Decision Making:   History:   Old Medical Records: I decided to obtain old medical records.  Old Records Summarized: other records.       <> Summary of Records: Urgent care visit on 6/22/25: tested +for strep. Placed on Amoxicillin 875 BID for 10 days.  Clinical Tests:   Lab Tests: Reviewed and Ordered       <> Summary of Lab: Strep+  Urgent Care Management:  Patient is non-toxic appearing and in no acute distress.  No focal lung findings, hypoxia, or prolonged period of symptoms to warrant CXR at this time as PNA is highly unlikely. No wheezing or respiratory distress. No sinus TTP or purulent rhinorrhea to suggest acute bacterial rhinosinusitis at this time. No evidence of AOM, mastoiditis, PTA, Primitivo's, epiglottitis, and meningitis.  Reviewed + strep test with patient. Advised patient that symptoms are bacterial in nature and should be treated with abx.  We discussed over-the-counter medications as well as home remedies to help with current symptoms.  Patient verbalized understanding.  Patient  educational handouts also included in discharge paperwork for patient who verbalized understanding agrees with plan of care.  They deny any further questions or concerns at this time.  Patient exits exam room in no acute distress.     Patient Instructions                                                         Pharyngitis   Reviewed positive strep test with patient who verbalized understanding.  We discussed treatment with an antibiotic to cover the strep throat infection as well as at home remedies to help with current symptoms and over-the-counter medications that can also help with diagnosis.  Patient educational handouts also included with discharge instructions for patient who verbalized understanding agrees with plan of care.  Patient denies any further questions or concerns at this time.  Patient exits exam room in no acute distress.      If your condition worsens or fails to improve we recommend that you receive another evaluation at the ER immediately or contact your PCP to discuss your concerns or return here. You must understand that you've received an urgent care treatment only and that you may be released before all your medical problems are known or treated. You the patient will arrange for followup care as instructed.   The majority of all sore throats or tonsillitis are viral and antibiotics will not treat this.     Strep test Positive:  - Complete the full course of antibiotics given  - Get plenty of rest and Drink plenty of cool liquids while avoid any beverage or food that can irritate your throat (acidic, spicy or salty foods).  - Gargle salt water  - Use over the counter Cepacol to soothe throat symptoms  - Tylenol or ibuprofen for pain may help as long as you are not allergic to these meds or have a medical condition such as stomach ulcers, liver or kidney disease or taking blood thinners etc that would prevent you from using these medications.   - Throw away your toothbrush now and when you  complete your antibiotics.